# Patient Record
Sex: MALE | Race: WHITE | Employment: PART TIME | URBAN - METROPOLITAN AREA
[De-identification: names, ages, dates, MRNs, and addresses within clinical notes are randomized per-mention and may not be internally consistent; named-entity substitution may affect disease eponyms.]

---

## 2017-01-04 ENCOUNTER — HOSPITAL ENCOUNTER (OUTPATIENT)
Dept: PHYSICAL THERAPY | Age: 67
Discharge: HOME | End: 2017-01-04
Admitting: ORTHOPAEDIC SURGERY

## 2017-01-10 ENCOUNTER — HOSPITAL ENCOUNTER (OUTPATIENT)
Dept: PHYSICAL THERAPY | Age: 67
Discharge: HOME | End: 2017-01-10
Admitting: ORTHOPAEDIC SURGERY

## 2017-01-11 ENCOUNTER — HOSPITAL ENCOUNTER (OUTPATIENT)
Dept: PHYSICAL THERAPY | Age: 67
Discharge: HOME | End: 2017-01-11
Admitting: ORTHOPAEDIC SURGERY

## 2017-01-18 ENCOUNTER — HOSPITAL ENCOUNTER (OUTPATIENT)
Dept: PHYSICAL THERAPY | Age: 67
Discharge: HOME | End: 2017-01-18
Admitting: ORTHOPAEDIC SURGERY

## 2017-01-20 ENCOUNTER — HOSPITAL ENCOUNTER (OUTPATIENT)
Dept: PHYSICAL THERAPY | Age: 67
Discharge: HOME | End: 2017-01-20
Admitting: ORTHOPAEDIC SURGERY

## 2017-01-24 ENCOUNTER — OFFICE VISIT (OUTPATIENT)
Dept: ORTHOPEDIC SURGERY | Age: 67
End: 2017-01-24

## 2017-01-24 VITALS
HEART RATE: 74 BPM | BODY MASS INDEX: 25.33 KG/M2 | WEIGHT: 187 LBS | SYSTOLIC BLOOD PRESSURE: 140 MMHG | HEIGHT: 72 IN | DIASTOLIC BLOOD PRESSURE: 78 MMHG

## 2017-01-24 DIAGNOSIS — M25.572 LEFT ANKLE PAIN, UNSPECIFIED CHRONICITY: Primary | ICD-10-CM

## 2017-01-24 DIAGNOSIS — M19.072 PRIMARY OSTEOARTHRITIS OF LEFT ANKLE: ICD-10-CM

## 2017-01-24 PROCEDURE — 99212 OFFICE O/P EST SF 10 MIN: CPT | Performed by: ORTHOPAEDIC SURGERY

## 2017-01-24 PROCEDURE — 73610 X-RAY EXAM OF ANKLE: CPT | Performed by: ORTHOPAEDIC SURGERY

## 2017-01-26 ENCOUNTER — OFFICE VISIT (OUTPATIENT)
Dept: ORTHOPEDIC SURGERY | Age: 67
End: 2017-01-26

## 2017-01-26 DIAGNOSIS — G57.02 SCIATIC MONONEUROPATHY, LEFT: Primary | ICD-10-CM

## 2017-01-26 PROCEDURE — 95886 MUSC TEST DONE W/N TEST COMP: CPT | Performed by: PHYSICAL MEDICINE & REHABILITATION

## 2017-01-26 PROCEDURE — 95909 NRV CNDJ TST 5-6 STUDIES: CPT | Performed by: PHYSICAL MEDICINE & REHABILITATION

## 2017-01-27 ENCOUNTER — TELEPHONE (OUTPATIENT)
Dept: ORTHOPEDIC SURGERY | Age: 67
End: 2017-01-27

## 2017-01-27 ENCOUNTER — HOSPITAL ENCOUNTER (OUTPATIENT)
Dept: PHYSICAL THERAPY | Age: 67
Discharge: HOME | End: 2017-01-27
Admitting: ORTHOPAEDIC SURGERY

## 2017-02-15 ENCOUNTER — HOSPITAL ENCOUNTER (OUTPATIENT)
Dept: PHYSICAL THERAPY | Age: 67
Discharge: HOME | End: 2017-02-15
Admitting: ORTHOPAEDIC SURGERY

## 2017-03-07 ENCOUNTER — HOSPITAL ENCOUNTER (OUTPATIENT)
Dept: PHYSICAL THERAPY | Age: 67
Discharge: HOME | End: 2017-03-07
Admitting: ORTHOPAEDIC SURGERY

## 2017-03-07 ENCOUNTER — OFFICE VISIT (OUTPATIENT)
Dept: ORTHOPEDIC SURGERY | Age: 67
End: 2017-03-07

## 2017-03-07 VITALS
BODY MASS INDEX: 25.32 KG/M2 | HEART RATE: 72 BPM | WEIGHT: 186.95 LBS | SYSTOLIC BLOOD PRESSURE: 131 MMHG | HEIGHT: 72 IN | DIASTOLIC BLOOD PRESSURE: 81 MMHG

## 2017-03-07 DIAGNOSIS — Z96.662 HISTORY OF TOTAL REPLACEMENT OF LEFT ANKLE: Primary | ICD-10-CM

## 2017-03-07 PROCEDURE — 99212 OFFICE O/P EST SF 10 MIN: CPT | Performed by: ORTHOPAEDIC SURGERY

## 2017-03-07 PROCEDURE — L1902 AFO ANKLE GAUNTLET PRE OTS: HCPCS | Performed by: ORTHOPAEDIC SURGERY

## 2017-05-05 ENCOUNTER — TELEPHONE (OUTPATIENT)
Dept: ORTHOPEDIC SURGERY | Age: 67
End: 2017-05-05

## 2017-05-31 ENCOUNTER — HOSPITAL ENCOUNTER (OUTPATIENT)
Dept: PHYSICAL THERAPY | Age: 67
Discharge: HOME | End: 2017-06-30
Attending: ORTHOPAEDIC SURGERY | Admitting: ORTHOPAEDIC SURGERY

## 2017-06-09 ENCOUNTER — OFFICE VISIT (OUTPATIENT)
Dept: ORTHOPEDIC SURGERY | Age: 67
End: 2017-06-09

## 2017-06-09 VITALS — HEIGHT: 72 IN | WEIGHT: 186.95 LBS | BODY MASS INDEX: 25.32 KG/M2

## 2017-06-09 DIAGNOSIS — Z96.662 HISTORY OF TOTAL REPLACEMENT OF LEFT ANKLE: Primary | ICD-10-CM

## 2017-06-09 PROCEDURE — 73610 X-RAY EXAM OF ANKLE: CPT | Performed by: ORTHOPAEDIC SURGERY

## 2017-06-09 PROCEDURE — 99212 OFFICE O/P EST SF 10 MIN: CPT | Performed by: ORTHOPAEDIC SURGERY

## 2017-06-27 ENCOUNTER — OFFICE VISIT (OUTPATIENT)
Dept: ORTHOPEDIC SURGERY | Age: 67
End: 2017-06-27

## 2017-06-27 DIAGNOSIS — M54.32 SCIATICA OF LEFT SIDE: Primary | ICD-10-CM

## 2017-06-27 PROCEDURE — 95907 NVR CNDJ TST 1-2 STUDIES: CPT | Performed by: PHYSICAL MEDICINE & REHABILITATION

## 2017-06-27 PROCEDURE — 95886 MUSC TEST DONE W/N TEST COMP: CPT | Performed by: PHYSICAL MEDICINE & REHABILITATION

## 2017-06-28 ENCOUNTER — TELEPHONE (OUTPATIENT)
Dept: ORTHOPEDIC SURGERY | Age: 67
End: 2017-06-28

## 2017-06-28 DIAGNOSIS — Z96.662 HISTORY OF TOTAL ANKLE REPLACEMENT, LEFT: Primary | ICD-10-CM

## 2017-10-06 ENCOUNTER — OFFICE VISIT (OUTPATIENT)
Dept: ORTHOPEDIC SURGERY | Age: 67
End: 2017-10-06

## 2017-10-06 VITALS
SYSTOLIC BLOOD PRESSURE: 146 MMHG | HEIGHT: 72 IN | WEIGHT: 186.07 LBS | HEART RATE: 69 BPM | DIASTOLIC BLOOD PRESSURE: 91 MMHG | BODY MASS INDEX: 25.2 KG/M2

## 2017-10-06 DIAGNOSIS — Z96.662 HISTORY OF TOTAL REPLACEMENT OF LEFT ANKLE: ICD-10-CM

## 2017-10-06 DIAGNOSIS — M25.572 LEFT ANKLE PAIN, UNSPECIFIED CHRONICITY: Primary | ICD-10-CM

## 2017-10-06 PROCEDURE — L4350 ANKLE CONTROL ORTHO PRE OTS: HCPCS | Performed by: ORTHOPAEDIC SURGERY

## 2017-10-06 PROCEDURE — 73610 X-RAY EXAM OF ANKLE: CPT | Performed by: ORTHOPAEDIC SURGERY

## 2017-10-06 PROCEDURE — 99212 OFFICE O/P EST SF 10 MIN: CPT | Performed by: ORTHOPAEDIC SURGERY

## 2017-10-06 NOTE — PROGRESS NOTES
Subjective: Patient states that he is here for follow-up of his 9/26/16 left total ankle replacement with postoperative foot drop. He's had 2 EMGs and the most recent one showed no significant change from previous. It shows sciatic mononeuropathy. He states that he's noticed some improvement in his motion but his main complaint is numbness. He does think that is improving. He has no pain whatsoever  Objective: Physical exam shows left anterior ankle incision is well-healed he has 5° of dorsiflexion 30-40° of plantarflexion no instability strength is 3/5 into dorsiflexion eversion 3+4 minus over 5 in a plantarflexion inversion he has some numbness in the 1st webspace and has a circumferential feeling of tightness around the distal midfoot proximal forefoot. Atrophy has decreased he ambulates without assistive device and uses an air sport brace  Imaging: 3 views of his left ankle show that his prosthesis is well aligned no change in position. Assessment and plan: This patient is improving he is using muscle stem at home. I gave him a new air sport brace that he continues which helped some Abbey foot. He did not want a AFO. He states he walks 3 miles a day at [address] and would like to get to the point where he could walk 5 miles a day's back playing golf but it's limited. He has some balance issues.   Follow-up with me in 6 months and then on his 2 year anniversary repeat x-rays on his next visit

## 2018-03-08 ENCOUNTER — OFFICE VISIT (OUTPATIENT)
Dept: ORTHOPEDIC SURGERY | Age: 68
End: 2018-03-08

## 2018-03-08 ENCOUNTER — TELEPHONE (OUTPATIENT)
Dept: ORTHOPEDIC SURGERY | Age: 68
End: 2018-03-08

## 2018-03-08 VITALS
DIASTOLIC BLOOD PRESSURE: 96 MMHG | HEART RATE: 90 BPM | HEIGHT: 72 IN | WEIGHT: 186.07 LBS | BODY MASS INDEX: 25.2 KG/M2 | SYSTOLIC BLOOD PRESSURE: 139 MMHG

## 2018-03-08 DIAGNOSIS — M25.572 LEFT ANKLE PAIN, UNSPECIFIED CHRONICITY: Primary | ICD-10-CM

## 2018-03-08 PROCEDURE — 99212 OFFICE O/P EST SF 10 MIN: CPT | Performed by: ORTHOPAEDIC SURGERY

## 2018-03-09 ENCOUNTER — ORTHOTIC/BRACE ENCOUNTER (OUTPATIENT)
Dept: ORTHOPEDIC SURGERY | Age: 68
End: 2018-03-09

## 2018-03-23 ENCOUNTER — ORTHOTIC/BRACE ENCOUNTER (OUTPATIENT)
Dept: ORTHOPEDIC SURGERY | Age: 68
End: 2018-03-23

## 2018-03-23 DIAGNOSIS — M65.9 SYNOVITIS OF LEFT ANKLE: Primary | ICD-10-CM

## 2018-03-23 DIAGNOSIS — M19.072 PRIMARY OSTEOARTHRITIS OF LEFT ANKLE: ICD-10-CM

## 2018-03-23 DIAGNOSIS — Z96.662 HISTORY OF TOTAL REPLACEMENT OF LEFT ANKLE: ICD-10-CM

## 2018-03-23 PROCEDURE — L1960 AFO POS SOLID ANK PLASTIC MO: HCPCS | Performed by: PEDORTHIST

## 2018-03-23 PROCEDURE — L2275 PLASTIC MOD LOW EXT PAD/LINE: HCPCS | Performed by: PEDORTHIST

## 2018-03-23 PROCEDURE — L2820 SOFT INTERFACE BELOW KNEE SE: HCPCS | Performed by: PEDORTHIST

## 2018-10-02 ENCOUNTER — OFFICE VISIT (OUTPATIENT)
Dept: ORTHOPEDIC SURGERY | Age: 68
End: 2018-10-02
Payer: COMMERCIAL

## 2018-10-02 VITALS — BODY MASS INDEX: 25.73 KG/M2 | WEIGHT: 190 LBS | HEIGHT: 72 IN

## 2018-10-02 DIAGNOSIS — M25.561 RIGHT KNEE PAIN, UNSPECIFIED CHRONICITY: ICD-10-CM

## 2018-10-02 DIAGNOSIS — Z96.662 HISTORY OF TOTAL REPLACEMENT OF LEFT ANKLE: Primary | ICD-10-CM

## 2018-10-02 PROCEDURE — L1902 AFO ANKLE GAUNTLET PRE OTS: HCPCS | Performed by: ORTHOPAEDIC SURGERY

## 2018-10-02 PROCEDURE — 99212 OFFICE O/P EST SF 10 MIN: CPT | Performed by: ORTHOPAEDIC SURGERY

## 2018-10-02 PROCEDURE — 3017F COLORECTAL CA SCREEN DOC REV: CPT | Performed by: ORTHOPAEDIC SURGERY

## 2018-10-02 PROCEDURE — G8484 FLU IMMUNIZE NO ADMIN: HCPCS | Performed by: ORTHOPAEDIC SURGERY

## 2018-10-02 PROCEDURE — 4004F PT TOBACCO SCREEN RCVD TLK: CPT | Performed by: ORTHOPAEDIC SURGERY

## 2018-10-02 PROCEDURE — 1123F ACP DISCUSS/DSCN MKR DOCD: CPT | Performed by: ORTHOPAEDIC SURGERY

## 2018-10-02 PROCEDURE — 1101F PT FALLS ASSESS-DOCD LE1/YR: CPT | Performed by: ORTHOPAEDIC SURGERY

## 2018-10-02 PROCEDURE — G8427 DOCREV CUR MEDS BY ELIG CLIN: HCPCS | Performed by: ORTHOPAEDIC SURGERY

## 2018-10-02 PROCEDURE — G8419 CALC BMI OUT NRM PARAM NOF/U: HCPCS | Performed by: ORTHOPAEDIC SURGERY

## 2018-10-02 PROCEDURE — 4040F PNEUMOC VAC/ADMIN/RCVD: CPT | Performed by: ORTHOPAEDIC SURGERY

## 2018-12-05 ENCOUNTER — OFFICE VISIT (OUTPATIENT)
Dept: ORTHOPEDIC SURGERY | Age: 68
End: 2018-12-05
Payer: COMMERCIAL

## 2018-12-05 VITALS
HEART RATE: 71 BPM | SYSTOLIC BLOOD PRESSURE: 151 MMHG | HEIGHT: 72 IN | WEIGHT: 190.04 LBS | BODY MASS INDEX: 25.74 KG/M2 | DIASTOLIC BLOOD PRESSURE: 96 MMHG

## 2018-12-05 DIAGNOSIS — M17.11 PRIMARY OSTEOARTHRITIS OF RIGHT KNEE: ICD-10-CM

## 2018-12-05 DIAGNOSIS — M25.561 RIGHT KNEE PAIN, UNSPECIFIED CHRONICITY: Primary | ICD-10-CM

## 2018-12-05 DIAGNOSIS — M65.9 SYNOVITIS OF RIGHT KNEE: ICD-10-CM

## 2018-12-05 DIAGNOSIS — M22.41 CHONDROMALACIA OF PATELLA, RIGHT: ICD-10-CM

## 2018-12-05 PROCEDURE — 99214 OFFICE O/P EST MOD 30 MIN: CPT | Performed by: FAMILY MEDICINE

## 2018-12-05 PROCEDURE — 1123F ACP DISCUSS/DSCN MKR DOCD: CPT | Performed by: FAMILY MEDICINE

## 2018-12-05 PROCEDURE — 3017F COLORECTAL CA SCREEN DOC REV: CPT | Performed by: FAMILY MEDICINE

## 2018-12-05 PROCEDURE — G8484 FLU IMMUNIZE NO ADMIN: HCPCS | Performed by: FAMILY MEDICINE

## 2018-12-05 PROCEDURE — 1101F PT FALLS ASSESS-DOCD LE1/YR: CPT | Performed by: FAMILY MEDICINE

## 2018-12-05 PROCEDURE — G8419 CALC BMI OUT NRM PARAM NOF/U: HCPCS | Performed by: FAMILY MEDICINE

## 2018-12-05 PROCEDURE — 4040F PNEUMOC VAC/ADMIN/RCVD: CPT | Performed by: FAMILY MEDICINE

## 2018-12-05 PROCEDURE — L1812 KO ELASTIC W/JOINTS PRE OTS: HCPCS | Performed by: FAMILY MEDICINE

## 2018-12-05 PROCEDURE — 4004F PT TOBACCO SCREEN RCVD TLK: CPT | Performed by: FAMILY MEDICINE

## 2018-12-05 PROCEDURE — G8427 DOCREV CUR MEDS BY ELIG CLIN: HCPCS | Performed by: FAMILY MEDICINE

## 2018-12-05 PROCEDURE — 20610 DRAIN/INJ JOINT/BURSA W/O US: CPT | Performed by: FAMILY MEDICINE

## 2018-12-05 RX ORDER — MELOXICAM 15 MG/1
15 TABLET ORAL DAILY
Qty: 30 TABLET | Refills: 3 | Status: SHIPPED | OUTPATIENT
Start: 2018-12-05 | End: 2019-04-08

## 2018-12-05 NOTE — PATIENT INSTRUCTIONS
Patient Education        Patellofemoral Pain Syndrome (Runner's Knee): Exercises  Your Care Instructions  Here are some examples of typical rehabilitation exercises for your condition. Start each exercise slowly. Ease off the exercise if you start to have pain. Your doctor or physical therapist will tell you when you can start these exercises and which ones will work best for you. How to do the exercises  Calf wall stretch    1. Stand facing a wall with your hands on the wall at about eye level. Put your affected leg about a step behind your other leg. 2. Keeping your back leg straight and your back heel on the floor, bend your front knee and gently bring your hip and chest toward the wall until you feel a stretch in the calf of your back leg. 3. Hold the stretch for at least 15 to 30 seconds. 4. Repeat 2 to 4 times. 5. Repeat steps 1 through 4, but this time keep your back knee bent. Quadriceps stretch    1. If you are not steady on your feet, hold on to a chair, counter, or wall. 2. Bend your affected leg, and reach behind you to grab the front of your foot or ankle with the hand on the same side. For example, if you are stretching your right leg, use your right hand. 3. Keeping your knees next to each other, pull your foot toward your buttock until you feel a gentle stretch across the front of your hip and down the front of your thigh. Your knee should be pointed directly to the ground, and not out to the side. 4. Hold the stretch for at least 15 to 30 seconds. 5. Repeat 2 to 4 times. Hamstring wall stretch    1. Lie on your back in a doorway, with your good leg through the open door. 2. Slide your affected leg up the wall to straighten your knee. You should feel a gentle stretch down the back of your leg. 1. Do not arch your back. 2. Do not bend either knee. 3. Keep one heel touching the floor and the other heel touching the wall. Do not point your toes.   3. Hold the stretch for at least 1

## 2018-12-06 NOTE — PROGRESS NOTES
reproduced with patellar grind testing. There does not appear to be high-grade instability. His hamstrings are somewhat tight. He is able to completely extend with flexion 140. Negative Emily's testing. Negative stability testing. Additional Examinations:       Contralateral Exam: Examination of the right ankle reveals intact skin. There is no focal tenderness or swelling. The patient demonstrates full painless range of motion with regards to plantarflexion, dorsiflexion, inversion, eversion. Strength is 5/5 thorough out all planes. Ligamentous stability is grossly intact. Examination of the right knee reveals intact skin. There is no focal tenderness. The patient demonstrates full painless range of motion with regards to flexion and extension. Strength is 5/5 thorough out all planes. Ligamentous stability is grossly intact. Right Lower Extremity: Examination of the right lower extremity does not show any tenderness, deformity or injury. Range of motion is unremarkable. There is no gross instability. There are no rashes, ulcerations or lesions. Strength and tone are normal.  Left Lower Extremity: Examination of the left lower extremity does not show any tenderness, deformity or injury. Range of motion is unremarkable. There is no gross instability. There are no rashes, ulcerations or lesions. Strength and tone are normal.  Lower Back: Examination of the lower back does not show any tenderness, deformity or injury. Range of motion is unremarkable. There is no gross instability. There are no rashes, ulcerations or lesions. Strength and tone are normal.        Diagnostic Test Findings:     right knee AP and PA weight-bearing sunrise and lateral films were obtained today which showed evidence of medial compartment narrowing with early patellofemoral arthropathy.         Assessment :  #1.  3-6 months status post recurrent aggravation right knee medial anterior compartment osteoarthritis

## 2019-04-08 ENCOUNTER — OFFICE VISIT (OUTPATIENT)
Dept: ORTHOPEDIC SURGERY | Age: 69
End: 2019-04-08
Payer: COMMERCIAL

## 2019-04-08 VITALS
SYSTOLIC BLOOD PRESSURE: 133 MMHG | BODY MASS INDEX: 25.74 KG/M2 | HEART RATE: 80 BPM | HEIGHT: 72 IN | DIASTOLIC BLOOD PRESSURE: 79 MMHG | WEIGHT: 190.04 LBS

## 2019-04-08 DIAGNOSIS — G89.29 CHRONIC PAIN OF RIGHT KNEE: ICD-10-CM

## 2019-04-08 DIAGNOSIS — M25.561 CHRONIC PAIN OF RIGHT KNEE: ICD-10-CM

## 2019-04-08 DIAGNOSIS — M17.11 PRIMARY OSTEOARTHRITIS OF RIGHT KNEE: ICD-10-CM

## 2019-04-08 DIAGNOSIS — M22.41 CHONDROMALACIA OF PATELLA, RIGHT: Primary | ICD-10-CM

## 2019-04-08 PROCEDURE — 99214 OFFICE O/P EST MOD 30 MIN: CPT | Performed by: FAMILY MEDICINE

## 2019-04-08 RX ORDER — METHYLPREDNISOLONE 4 MG/1
TABLET ORAL
Qty: 21 KIT | Refills: 0 | Status: SHIPPED | OUTPATIENT
Start: 2019-04-08 | End: 2019-04-15 | Stop reason: ALTCHOICE

## 2019-04-08 NOTE — PROGRESS NOTES
contralateral left ankle. He did get some temporary improvement with his steroid injection several months ago but this is worn out and with the onset of golf season is now having worsening anterior medial knee pain. He tries to perform his home-based exercises and does take Aleve. He may have recently been noticing some catching with ptosis pain being medial and anterior in nature. He tries to perform his home-based exercise program and does utilize a knee brace periodically. He has not had tien locking. He does have a baseline dull achy pain at about a 3-4 out of 10 with rest and standing and walking but if he pivots and walks for long distances and goes up and down stairs and can still be in the range of 5-7 out of 10. He is not really experiencing high-grade night pain. Medical History    Patient's medications, allergies, past medical, surgical, social and family histories were reviewed 12/5/2018 and updated as appropriate. Review of Systems    Relevant review of systems reviewed 12/5/2018 and available in the patient's chart     Vital Signs  Vitals:    04/08/19 1400   BP: 133/79   Pulse: 80       General Exam:     Constitutional: Patient is adequately groomed with no evidence of malnutrition  DTRs: Deep tendon reflexes are intact  Mental Status: The patient is oriented to time, place and person. The patient's mood and affect are appropriate. Lymphatic: The lymphatic examination bilaterally reveals all areas to be without enlargement or induration. Vascular: Examination reveals no swelling or calf tenderness. Peripheral pulses are palpable and 2+. Neurological: The patient has good coordination. There is no weakness or sensory deficit. Right knee Examination  Inspection:  He does have mild swelling to his knee with some patellofemoral crepitation but no high-grade deformities.     Palpation:  He does have persistent tenderness medial and lateral patellofemoral facet and mild pain with patellar grind testing was noted. He does have diffuse anterior posterior 3rd medial joint line tenderness with less pain with lateral joint line palpation. Rang of Motion:  He is stiff at terminal 5° of extension with flexion to about 120 today. Hamstrings are tight. Strength:  He does have some weakness at 4-4+ out of 5 with the flexion and extension. Special Tests:  He does have a mildly positive patellar grind test.  Some mild pain with medial Emily's but no high-grade click. I do not sense high-grade instability and a screening hip testing is benign. Skin: There are no rashes, ulcerations or lesions. Distal motor sensory and vascular exam is intact. Gait:Minimal altalgia. He is status post total ankle arthroplasty in the past by Dr. Valerie Meyers. Reflex symmetrically preserved. Additional Comments:   Left knee examination does reveal a trace knee joint effusion with patellofemoral crepitation. He does have a mildly positive grind test.  He does have some mild anterior 3rd medial joint line tenderness. Most of his knee pain seems to be reproduced with patellar grind testing. There does not appear to be high-grade instability. His hamstrings are somewhat tight. He is able to completely extend with flexion 140. Negative Emily's testing. Negative stability testing. Additional Examinations:       Contralateral Exam: Examination of the right ankle reveals intact skin. There is no focal tenderness or swelling. The patient demonstrates full painless range of motion with regards to plantarflexion, dorsiflexion, inversion, eversion. Strength is 5/5 thorough out all planes. Ligamentous stability is grossly intact. Examination of the right knee reveals intact skin. There is no focal tenderness. The patient demonstrates full painless range of motion with regards to flexion and extension. Strength is 5/5 thorough out all planes.   Ligamentous stability is grossly intact. Right Lower Extremity: Examination of the right lower extremity does not show any tenderness, deformity or injury. Range of motion is unremarkable. There is no gross instability. There are no rashes, ulcerations or lesions. Strength and tone are normal.  Left Lower Extremity: Examination of the left lower extremity does not show any tenderness, deformity or injury. Range of motion is unremarkable. There is no gross instability. There are no rashes, ulcerations or lesions. Strength and tone are normal.  Lower Back: Examination of the lower back does not show any tenderness, deformity or injury. Range of motion is unremarkable. There is no gross instability. There are no rashes, ulcerations or lesions. Strength and tone are normal.        Diagnostic Test Findings:             Assessment :  #1.  6-9 months status post persistent recurrent aggravation right knee medial anterior compartment osteoarthritis with chondromalacia patella and synovitis 5+ year status post medial and lateral meniscectomies. #2 status post left total ankle arthroplasty per Dr. Gerard Espinosa. #3.. Reasonably stable left knee medial anterior compartment osteoarthritis. #4. Currently seeing rheumatology with history of PMR and rheumatoid arthritis (patient is stable on Embrel)  Impression:    Encounter Diagnoses   Name Primary?  Chondromalacia of patella, right Yes    Primary osteoarthritis of right knee     Chronic pain of right knee        Office Procedures:    Orders Placed This Encounter   Procedures    MRI Knee Right WO Contrast     Scheduling Instructions:      [facility] [place] [address] [address] [facility] #:      TIME AND DATE TBD      PLEASE CALL PATIENT ONCE APPROVED TO SCHEDULE             Remember that it may take several business days to pre-cert your MRI through your insurance.  Our office will contact you as soon as we have the approval. We will not give any test results over the phone. Please call [phone] once you have your test day and time to schedule a follow up with Dr. Crystal Smalls. Order Specific Question:   Reason for exam:     Answer:   EVAL OA/CMP. R/O REMNANT MMT            Treatment Plan:  Treatment options were discussed with Rnean Ramires. We did once again review his exam and plain film findings. .  Does have medial and anterior compartment osteoarthritis to his right knee and is about 5+ years out from his left knee arthroscopy with medial and lateral meniscectomy. With his lack of improvement over the last 5 months, but from an MRI to evaluate the degree of his underlying arthritic changes and also to rule out a remnant medial meniscus tear. He may continue with his home brace although he may wish to consider a new J brace as his one from years ago is worn out. We did place him on a Medrol Dosepak and he will then continue with his Aleve 2 pills twice daily will continue with his icing and home-based exercises. He is going out of town at the end of next week and hopefully we can see him back post-MRI prior to this. He will continue with his Enbrel per rheumatology. Icing and activity modification was discussed. I will see him back post imaging but he was encouraged to contact us to interim with questions or concerns.

## 2019-04-08 NOTE — PATIENT INSTRUCTIONS
*If you're currently taking an anti-inflammatory such as Advil, Aleve, Ibuprofen, Diclofenac, Naproxen, Meloxicam, Celebrex, or Relafen, please stop. *Take Medrol as directed on foil side of packaging. *Once you are finished with the medrol, then you may re-start or start your anti-inflammatory (ALEVE 2 TABLETS 2 TIMES DAILY) the following day.

## 2019-04-09 ENCOUNTER — TELEPHONE (OUTPATIENT)
Dept: ORTHOPEDIC SURGERY | Age: 69
End: 2019-04-09

## 2019-04-15 ENCOUNTER — OFFICE VISIT (OUTPATIENT)
Dept: ORTHOPEDIC SURGERY | Age: 69
End: 2019-04-15
Payer: COMMERCIAL

## 2019-04-15 VITALS
SYSTOLIC BLOOD PRESSURE: 133 MMHG | BODY MASS INDEX: 25.74 KG/M2 | WEIGHT: 190.04 LBS | DIASTOLIC BLOOD PRESSURE: 85 MMHG | HEIGHT: 72 IN | HEART RATE: 74 BPM

## 2019-04-15 DIAGNOSIS — M25.561 RIGHT KNEE PAIN, UNSPECIFIED CHRONICITY: ICD-10-CM

## 2019-04-15 DIAGNOSIS — M17.11 PRIMARY OSTEOARTHRITIS OF RIGHT KNEE: Primary | ICD-10-CM

## 2019-04-15 DIAGNOSIS — S83.241A OTHER TEAR OF MEDIAL MENISCUS OF RIGHT KNEE AS CURRENT INJURY, INITIAL ENCOUNTER: ICD-10-CM

## 2019-04-15 DIAGNOSIS — M22.41 CHONDROMALACIA PATELLAE OF RIGHT KNEE: ICD-10-CM

## 2019-04-15 PROCEDURE — 99214 OFFICE O/P EST MOD 30 MIN: CPT | Performed by: FAMILY MEDICINE

## 2019-04-30 ENCOUNTER — APPOINTMENT (OUTPATIENT)
Dept: GENERAL RADIOLOGY | Age: 69
End: 2019-04-30
Payer: OTHER MISCELLANEOUS

## 2019-04-30 ENCOUNTER — HOSPITAL ENCOUNTER (EMERGENCY)
Age: 69
Discharge: HOME | End: 2019-04-30
Attending: EMERGENCY MEDICINE
Payer: OTHER MISCELLANEOUS

## 2019-04-30 VITALS
BODY MASS INDEX: 26.6 KG/M2 | RESPIRATION RATE: 12 BRPM | HEART RATE: 78 BPM | SYSTOLIC BLOOD PRESSURE: 135 MMHG | TEMPERATURE: 98.7 F | HEIGHT: 71 IN | DIASTOLIC BLOOD PRESSURE: 91 MMHG | OXYGEN SATURATION: 98 % | WEIGHT: 190 LBS

## 2019-04-30 DIAGNOSIS — S80.212A ABRASION OF LEFT KNEE, INITIAL ENCOUNTER: ICD-10-CM
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
## 2019-04-30 PROCEDURE — 90471 IMMUNIZATION ADMIN: CPT | Performed by: EMERGENCY MEDICINE

## 2019-04-30 PROCEDURE — 73562 X-RAY EXAM OF KNEE 3: CPT

## 2019-04-30 PROCEDURE — 99283 EMERGENCY DEPT VISIT LOW MDM: CPT

## 2019-04-30 PROCEDURE — 6360000002 HC RX W HCPCS: Performed by: EMERGENCY MEDICINE

## 2019-04-30 PROCEDURE — 90715 TDAP VACCINE 7 YRS/> IM: CPT | Performed by: EMERGENCY MEDICINE

## 2019-04-30 RX ADMIN — TETANUS TOXOID, REDUCED DIPHTHERIA TOXOID AND ACELLULAR PERTUSSIS VACCINE, ADSORBED 0.5 ML: 5; 2.5; 8; 8; 2.5 SUSPENSION INTRAMUSCULAR at 15:38
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
## 2019-05-03 ENCOUNTER — OFFICE VISIT (OUTPATIENT)
Dept: ORTHOPEDIC SURGERY | Age: 69
End: 2019-05-03
Payer: COMMERCIAL

## 2019-05-03 VITALS
DIASTOLIC BLOOD PRESSURE: 92 MMHG | HEIGHT: 71 IN | HEART RATE: 66 BPM | BODY MASS INDEX: 26.6 KG/M2 | WEIGHT: 190.04 LBS | SYSTOLIC BLOOD PRESSURE: 159 MMHG

## 2019-05-03 DIAGNOSIS — M25.561 ACUTE PAIN OF RIGHT KNEE: Primary | ICD-10-CM

## 2019-05-03 PROCEDURE — 99212 OFFICE O/P EST SF 10 MIN: CPT | Performed by: ORTHOPAEDIC SURGERY
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
## 2019-10-02 ENCOUNTER — OFFICE VISIT (OUTPATIENT)
Dept: ORTHOPEDIC SURGERY | Age: 69
End: 2019-10-02
Payer: OTHER MISCELLANEOUS

## 2019-10-02 VITALS
BODY MASS INDEX: 26.6 KG/M2 | HEART RATE: 72 BPM | HEIGHT: 71 IN | WEIGHT: 190.04 LBS | SYSTOLIC BLOOD PRESSURE: 139 MMHG | DIASTOLIC BLOOD PRESSURE: 84 MMHG

## 2019-10-02 DIAGNOSIS — S83.241A OTHER TEAR OF MEDIAL MENISCUS OF RIGHT KNEE AS CURRENT INJURY, INITIAL ENCOUNTER: Primary | ICD-10-CM

## 2019-10-02 DIAGNOSIS — M25.561 ACUTE PAIN OF RIGHT KNEE: ICD-10-CM

## 2019-10-02 DIAGNOSIS — M17.11 PRIMARY OSTEOARTHRITIS OF RIGHT KNEE: ICD-10-CM

## 2019-10-02 DIAGNOSIS — M22.41 CHONDROMALACIA PATELLAE OF RIGHT KNEE: ICD-10-CM

## 2019-10-02 PROCEDURE — 99214 OFFICE O/P EST MOD 30 MIN: CPT | Performed by: FAMILY MEDICINE

## 2019-10-02 PROCEDURE — 20610 DRAIN/INJ JOINT/BURSA W/O US: CPT | Performed by: FAMILY MEDICINE

## 2019-10-02 RX ORDER — DICLOFENAC SODIUM 75 MG/1
75 TABLET, DELAYED RELEASE ORAL 2 TIMES DAILY
Qty: 60 TABLET | Refills: 3 | Status: SHIPPED | OUTPATIENT
Start: 2019-10-02 | End: 2021-09-22 | Stop reason: ALTCHOICE

## 2019-10-02 RX ORDER — BETAMETHASONE SODIUM PHOSPHATE AND BETAMETHASONE ACETATE 3; 3 MG/ML; MG/ML
12 INJECTION, SUSPENSION INTRA-ARTICULAR; INTRALESIONAL; INTRAMUSCULAR; SOFT TISSUE ONCE
Status: COMPLETED | OUTPATIENT
Start: 2019-10-02 | End: 2019-10-02

## 2019-10-02 RX ADMIN — BETAMETHASONE SODIUM PHOSPHATE AND BETAMETHASONE ACETATE 12 MG: 3; 3 INJECTION, SUSPENSION INTRA-ARTICULAR; INTRALESIONAL; INTRAMUSCULAR; SOFT TISSUE at 13:23

## 2019-10-16 ENCOUNTER — TELEPHONE (OUTPATIENT)
Dept: ORTHOPEDIC SURGERY | Age: 69
End: 2019-10-16

## 2019-10-21 ENCOUNTER — OFFICE VISIT (OUTPATIENT)
Dept: ORTHOPEDIC SURGERY | Age: 69
End: 2019-10-21
Payer: COMMERCIAL

## 2019-10-21 VITALS
WEIGHT: 190.04 LBS | BODY MASS INDEX: 26.6 KG/M2 | HEART RATE: 83 BPM | SYSTOLIC BLOOD PRESSURE: 135 MMHG | DIASTOLIC BLOOD PRESSURE: 82 MMHG | HEIGHT: 71 IN

## 2019-10-21 DIAGNOSIS — M25.561 ACUTE PAIN OF RIGHT KNEE: ICD-10-CM

## 2019-10-21 DIAGNOSIS — S83.241A OTHER TEAR OF MEDIAL MENISCUS OF RIGHT KNEE AS CURRENT INJURY, INITIAL ENCOUNTER: ICD-10-CM

## 2019-10-21 DIAGNOSIS — M17.11 PRIMARY OSTEOARTHRITIS OF RIGHT KNEE: Primary | ICD-10-CM

## 2019-10-21 DIAGNOSIS — M22.41 CHONDROMALACIA PATELLAE OF RIGHT KNEE: ICD-10-CM

## 2019-10-21 PROCEDURE — 20610 DRAIN/INJ JOINT/BURSA W/O US: CPT | Performed by: FAMILY MEDICINE

## 2019-10-21 PROCEDURE — 99213 OFFICE O/P EST LOW 20 MIN: CPT | Performed by: FAMILY MEDICINE

## 2019-10-28 ENCOUNTER — OFFICE VISIT (OUTPATIENT)
Dept: ORTHOPEDIC SURGERY | Age: 69
End: 2019-10-28
Payer: COMMERCIAL

## 2019-10-28 VITALS — BODY MASS INDEX: 26.6 KG/M2 | WEIGHT: 190.04 LBS | HEIGHT: 71 IN

## 2019-10-28 DIAGNOSIS — M17.11 PRIMARY OSTEOARTHRITIS OF RIGHT KNEE: Primary | ICD-10-CM

## 2019-10-28 DIAGNOSIS — M25.561 ACUTE PAIN OF RIGHT KNEE: ICD-10-CM

## 2019-10-28 DIAGNOSIS — M22.41 CHONDROMALACIA PATELLAE OF RIGHT KNEE: ICD-10-CM

## 2019-10-28 PROCEDURE — 20610 DRAIN/INJ JOINT/BURSA W/O US: CPT | Performed by: FAMILY MEDICINE

## 2019-11-04 ENCOUNTER — OFFICE VISIT (OUTPATIENT)
Dept: ORTHOPEDIC SURGERY | Age: 69
End: 2019-11-04
Payer: COMMERCIAL

## 2019-11-04 VITALS — HEIGHT: 71 IN | WEIGHT: 190.04 LBS | BODY MASS INDEX: 26.6 KG/M2

## 2019-11-04 DIAGNOSIS — M22.41 CHONDROMALACIA PATELLAE OF RIGHT KNEE: ICD-10-CM

## 2019-11-04 DIAGNOSIS — M17.11 PRIMARY OSTEOARTHRITIS OF RIGHT KNEE: Primary | ICD-10-CM

## 2019-11-04 DIAGNOSIS — M25.561 ACUTE PAIN OF RIGHT KNEE: ICD-10-CM

## 2019-11-04 PROCEDURE — 20610 DRAIN/INJ JOINT/BURSA W/O US: CPT | Performed by: FAMILY MEDICINE

## 2020-01-15 ENCOUNTER — OFFICE VISIT (OUTPATIENT)
Dept: ORTHOPEDIC SURGERY | Age: 70
End: 2020-01-15
Payer: COMMERCIAL

## 2020-01-15 VITALS — BODY MASS INDEX: 26.6 KG/M2 | HEIGHT: 71 IN | WEIGHT: 190.04 LBS

## 2020-01-15 PROCEDURE — 99213 OFFICE O/P EST LOW 20 MIN: CPT | Performed by: FAMILY MEDICINE

## 2020-01-15 NOTE — PROGRESS NOTES
reproduced with patellar grind testing.  There does not appear to be high-grade instability.  His hamstrings are somewhat tight.  He is able to completely extend with flexion 140.  Negative Emily's testing.  Negative stability testing.     Additional Examinations:       Contralateral Exam: Examination of the right ankle reveals intact skin.  There is no focal tenderness or swelling.  The patient demonstrates full painless range of motion with regards to plantarflexion, dorsiflexion, inversion, eversion.  Strength is 5/5 thorough out all planes.  Ligamentous stability is grossly intact. Examination of the right knee reveals intact skin.  There is no focal tenderness.  The patient demonstrates full painless range of motion with regards to flexion and extension.  Strength is 5/5 thorough out all planes.  Ligamentous stability is grossly intact.     Right Lower Extremity: Examination of the right lower extremity does not show any tenderness, deformity or injury.  Range of motion is unremarkable. Linda Luis is no gross instability.  There are no rashes, ulcerations or lesions.  Strength and tone are normal.  Left Lower Extremity: Examination of the left lower extremity does not show any tenderness, deformity or injury.  Range of motion is unremarkable. Linda Luis is no gross instability.  There are no rashes, ulcerations or lesions.  Strength and tone are normal.  Lower Back: Examination of the lower back does not show any tenderness, deformity or injury.  Range of motion is unremarkable. Linda Luis is no gross instability.  There are no rashes, ulcerations or lesions.  Strength and tone are normal.        Diagnostic Test Findings:     MRI right knee obtained 4/12/2019 as listed above     Narrative   [facility] [id number] #: [id number] Procedure: MR Left Knee w/o Contrast ; Reason for Exam: eval oa/cmp. r/o remnant mmt. primary osteoarthritis of right knee. chronic pain of right knee.    This document is confidential medical information.  Unauthorized disclosure or use of this information is prohibited by law. If you are not the intended recipient of this document, please advise us by calling immediately [phone].       [facility] [address]           Patient Name: Suad Weaver   Case ID: [id number]   Patient : 1950   Referring Physician: Nathaniel Tillman MD   Exam Date: 2019   Exam Description: MR Left Knee w/o Contrast            HISTORY:  Evaluate OA/CMP.  Evaluate for remnant medial meniscus tear. Primary osteoarthritis    of right knee. Chronic pain of right knee.       TECHNICAL FACTORS:  Long- and short-axis fat- and water-weighted images were performed. 1.5    High Field Oval.        COMPARISON:  Prior right knee MRI May 15, 2012.       FINDINGS:  Fissuring and fraying involves the superficial patellar cartilage.       ACL, PCL, MCL, flexor mechanism, and extensor mechanism are intact.       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  Prior study demonstrated interstitial tear of the MCL and    mineralization in this region as well.       Medial meniscus is small and has been partially resected.  Remnant tearing is present.  Lateral    meniscus body and free edge fraying.  Meniscectomy change is suspected.       Dissecting Baker's cyst is noted posteromedially.  Intermediate-grade-to-high-grade chondral    irregularity involves the weight and nonweightbearing medial femoral condyle and medial tibia.     ACL and  PCL sheath thickening is present.       CONCLUSION:   1. Interval partial medial and lateral meniscectomy.  Remnant tearing involves the posterior    horn-body remnant.  High-grade chondromalacia involves the weight and nonweightbearing medial    femoral condyle and less so medial tibia. 2. Intermediate-grade-to-high-grade patellofemoral chondromalacia.    3. ACL and PCL sheath thickening or sheath synovitis.  No tear is demonstrated. 4. Dissecting septated, leaking or dehisced Baker's cyst posteromedially.  Synovial thickening    is present within the Baker's cyst.  Semimembranosus bursal inflammation is contributory. 5. Chronic MCL sprain and scarring.  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 6. Please see above.                       Thank you for the opportunity to provide your interpretation.       Priyank Wilson MD       A: Ryan 04/12/2019 9:51 AM   T: YAZMIN 04/12/2019 9:01 AM                 Assessment & Plan:    Encounter Diagnoses   Name Primary?  Primary osteoarthritis of right knee Yes    Chondromalacia patellae of right knee     Acute pain of right knee        No orders of the defined types were placed in this encounter. Treatment Plan:  Treatment options were discussed with Carolina Chang. We did once again review his exam and recent right knee MRI film findings. Fernanda Lawrence have medial and anterior compartment osteoarthritis to his right knee and is about 5+ years out from his left knee arthroscopy with medial and lateral meniscectomy.  He does appear to have a small remnant tear to the medial meniscus but I think the majority the pain is likely related to his  high-grade medial anterior compartment osteo-arthritic change.    He did have consultation with Dr. Anamaria Ling who recommended against surgical intervention. Is doing reasonably well with about a 70% improvement following completion of his Euflexxa series on 11/4/2019. He states that his current symptoms are tolerable and would not require steroid injection at this time. He may continue with his wraparound brace and will continue with his home-based exercise program as well as over-the-counter Aleve which he takes only episodically. Try the diclofenac. He is aware that he can have repeat Visco supplementation after 5/4/2020.   He does have a trip to [place] for a couple weeks in April of this year and may call to request a steroid injection if he is a little bit more sore. He will contact us in the interim with questions or concerns.             This dictation was performed with a verbal recognition program (DRAGON) and it was checked for errors. It is possible that there are still dictated errors within this office note. If so, please bring any errors to my attention for an addendum. All efforts were made to ensure that this office note is accurate.

## 2020-04-13 ENCOUNTER — TELEPHONE (OUTPATIENT)
Dept: ORTHOPEDIC SURGERY | Age: 70
End: 2020-04-13

## 2020-05-06 ENCOUNTER — OFFICE VISIT (OUTPATIENT)
Dept: ORTHOPEDIC SURGERY | Age: 70
End: 2020-05-06
Payer: COMMERCIAL

## 2020-05-06 PROCEDURE — 20610 DRAIN/INJ JOINT/BURSA W/O US: CPT | Performed by: FAMILY MEDICINE

## 2020-05-06 PROCEDURE — 99213 OFFICE O/P EST LOW 20 MIN: CPT | Performed by: FAMILY MEDICINE

## 2020-05-06 NOTE — PROGRESS NOTES
Chief Complaint  Knee Pain      Tosin Thrasher is a 79 y.o. 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 Vijay Mallory who is seen today for follow-up on his right knee osteoarthritis and consideration of repeat Visco supplementation    History of Present Illness for Follow Up Patient:      Emy Mathew is being seen in follow up today for chronic right knee pain. The patient rates their current pain as a 2 out of 10 on the pain scale. His issues are mostly general stiffness and has some trouble with getting in and out of a car. Activities relieving current symptoms include rest. Treatment to date includes: rest, ice, NSAID-Aleve periodically, home exercises, cortisone injection and Orthovisc series that he finished on 2019 to treat this problem and symptoms are improving. Tosin Thrasher reports a 70-75 % improvement in symptoms. He states that his current symptoms are very tolerable takes only occasional Aleve. He canceled his vacation trip to [place] last month to the coronavirus epidemic. He denies active locking or catching. Attest: I have reviewed and attest the documentation of the HPI documented by my . I will make any changes if necessary. Enc Date: 2020  Time: 12:56 PM  Provider: Alexandra Kenney MD        Social History     Tobacco Use    Smoking status: Current Some Day Smoker     Types: Cigars     Last attempt to quit: 2012     Years since quittin.2    Smokeless tobacco: Never Used   Substance Use Topics    Alcohol use: No     Alcohol/week: 0.0 standard drinks     Comment: quit drinking in 2015 due to medication    Drug use: No        Review of Systems  Pertinent items are noted in HPI  Review of systems reviewed from Patient History Form dated on 1/15/2020 and available in the patient's chart under the Media tab. standard prefilled 2 cc syringe. He may continue with his wraparound knee brace and his home-based exercise program as well as his anti-inflammatory medications. He will be seen back each of the next 2 weeks to continue with his Visco supplementation series. He will contact us in the interim with questions or concerns.             This dictation was performed with a verbal recognition program (DRAGON) and it was checked for errors. It is possible that there are still dictated errors within this office note. If so, please bring any errors to my attention for an addendum. All efforts were made to ensure that this office note is accurate.

## 2020-05-13 ENCOUNTER — OFFICE VISIT (OUTPATIENT)
Dept: ORTHOPEDIC SURGERY | Age: 70
End: 2020-05-13
Payer: COMMERCIAL

## 2020-05-13 VITALS — HEIGHT: 71 IN | BODY MASS INDEX: 26.6 KG/M2 | WEIGHT: 190.04 LBS

## 2020-05-13 PROCEDURE — 20610 DRAIN/INJ JOINT/BURSA W/O US: CPT | Performed by: FAMILY MEDICINE

## 2020-05-20 ENCOUNTER — OFFICE VISIT (OUTPATIENT)
Dept: ORTHOPEDIC SURGERY | Age: 70
End: 2020-05-20
Payer: COMMERCIAL

## 2020-05-20 VITALS — HEIGHT: 71 IN | BODY MASS INDEX: 26.6 KG/M2 | WEIGHT: 190.04 LBS

## 2020-05-20 PROCEDURE — 20610 DRAIN/INJ JOINT/BURSA W/O US: CPT | Performed by: FAMILY MEDICINE

## 2020-05-20 NOTE — PROGRESS NOTES
does anticipate going to [place] in late September and we can certainly see him in early September for a pre-vacation steroid injection if need be. He will contact us in the interim with questions or concerns.

## 2020-08-19 ENCOUNTER — TELEPHONE (OUTPATIENT)
Dept: ORTHOPEDIC SURGERY | Age: 70
End: 2020-08-19

## 2021-06-02 ENCOUNTER — OFFICE VISIT (OUTPATIENT)
Dept: ORTHOPEDIC SURGERY | Age: 71
End: 2021-06-02
Payer: COMMERCIAL

## 2021-06-02 VITALS — HEIGHT: 72 IN | WEIGHT: 185 LBS | BODY MASS INDEX: 25.06 KG/M2

## 2021-06-02 DIAGNOSIS — M17.11 PRIMARY OSTEOARTHRITIS OF RIGHT KNEE: Primary | ICD-10-CM

## 2021-06-02 DIAGNOSIS — M25.561 ACUTE PAIN OF RIGHT KNEE: ICD-10-CM

## 2021-06-02 DIAGNOSIS — M22.41 CHONDROMALACIA PATELLAE OF RIGHT KNEE: ICD-10-CM

## 2021-06-02 PROCEDURE — 99213 OFFICE O/P EST LOW 20 MIN: CPT | Performed by: FAMILY MEDICINE

## 2021-06-02 RX ORDER — DICLOFENAC SODIUM 75 MG/1
75 TABLET, DELAYED RELEASE ORAL 2 TIMES DAILY
Qty: 60 TABLET | Refills: 3 | Status: SHIPPED | OUTPATIENT
Start: 2021-06-02 | End: 2021-09-22 | Stop reason: ALTCHOICE

## 2021-06-02 RX ORDER — HYALURONATE SODIUM 16.8MG/2ML
168 SYRINGE (ML) INTRAARTICULAR ONCE
Qty: 2 ML | Refills: 0 | Status: SHIPPED
Start: 2021-06-02 | End: 2021-06-02 | Stop reason: CLARIF

## 2021-06-02 NOTE — PROGRESS NOTES
Chief Complaint  Knee Pain (R knee pain)      Quyen Garcia is a 70 y.o. 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 Rolando Gallardo who is seen today for follow-up on his recurrence of right knee pain. History of Present Illness for Follow Up Patient:      Kym Brown is being seen in follow up today for a recent acute on chronic right knee pain. The patient rates their current pain as a 2 out of 10 on the pain scale. His issues are mostly general stiffness and has some trouble with getting in and out of a car. Activities relieving current symptoms include rest. Treatment to date includes: rest, ice, NSAID- Diclofenac, home exercises, cortisone injection and Orthovisc series that he finished on 11/4/2019 to treat this problem and symptoms are improving. He states he was doing reasonably well with his knee after last completing viscosupplementation with Orthovisc to the right knee on 5/20/2020. He was able to get through summer and fall in the majority of wintered but towards the end of April 2021 he did experience a flare of his underlying knee osteoarthritis. He did take a Medrol Dosepak which is helped his symptoms substantially. Denies locking catching or true instability symptoms and does take Aleve episodically. As he has gotten to golf season he has had occasional soreness but at maximum is only about a 2-3 out of 10. He is played at least 15 rounds this year and is interested in pursuing viscosupplementation once again potentially. He would like to avoid surgical invention as long as possible.     Pain Assessment  Location of Pain: Knee  Location Modifiers: Right  Severity of Pain: 3  Quality of Pain: Dull, Aching  Duration of Pain: Persistent  Frequency of Pain: Constant  Aggravating Factors: Kneeling, Squatting, Stairs  Limiting Behavior: Yes  Result of Injury: Yes  Work-Related Injury: No  Are there other pain locations you wish to document?: No     Attest: I have reviewed and attest the documentation of the HPI documented by my . I will make any changes if necessary. Enc Date: 2021  Time: 2:06 PM  Provider: Shawn Potts MD        Social History     Tobacco Use    Smoking status: Current Some Day Smoker     Types: Cigars     Last attempt to quit: 2012     Years since quittin.2    Smokeless tobacco: Never Used   Substance Use Topics    Alcohol use: No     Alcohol/week: 0.0 standard drinks     Comment: quit drinking in 2015 due to medication    Drug use: No        Review of Systems  Pertinent items are noted in HPI  Review of systems reviewed from Patient History Form dated on 2021 and available in the patient's chart under the Media tab. Vital Signs     Ht 6' (1.829 m)   Wt 185 lb (83.9 kg)   BMI 25.09 kg/m²       General Exam:   Constitutional: Patient is adequately groomed with no evidence of malnutrition  DTRs: Deep tendon reflexes are intact  Mental Status: The patient is oriented to time, place and person. The patient's mood and affect are appropriate. Lymphatic: The lymphatic examination bilaterally reveals all areas to be without enlargement or induration. Vascular: Examination reveals no swelling or calf tenderness. Peripheral pulses are palpable and 2+. Neurological: The patient has good coordination. There is no weakness or sensory deficit.       Right knee Examination  Inspection:  He does have provements in his previous mild swelling to his knee with some patellofemoral crepitation but no high-grade deformities.     Palpation:  He does have recurrent  less prominent tenderness medial and lateral patellofemoral facet and mild pain with patellar grind testing was noted.  He does have diffuse anterior posterior 3rd medial joint line tenderness with less pain with lateral joint line palpation.     Rang of Motion:  He is stiff at terminal 5° of extension with flexion to about 120 today.  Hamstrings are tight.       Strength:  He does have some weakness at 4 out of 5 with the flexion and extension.       Special Tests:  He now has only mild pain with patellar grind test.  Mild pain with medial Emily's but no high-grade click.  I do not sense high-grade instability and a screening hip testing is benign.       Skin: There are no rashes, ulcerations or lesions.   Distal motor sensory and vascular exam is intact.     Gait: Improved gait.  He is status post total ankle arthroplasty in the past by Dr. Lloyd Hall.     Reflex symmetrically preserved.     Additional Comments:   Left knee examination does reveal a trace knee joint effusion with patellofemoral crepitation.  He does have a mildly positive grind test.  He does have some mild anterior 3rd medial joint line tenderness.  Most of his knee pain seems to be reproduced with patellar grind testing.  There does not appear to be high-grade instability.  His hamstrings are somewhat tight.  He is able to completely extend with flexion 140.  Negative Emily's testing.  Negative stability testing.     Additional Examinations:       Contralateral Exam: Examination of the right ankle reveals intact skin.  There is no focal tenderness or swelling.  The patient demonstrates full painless range of motion with regards to plantarflexion, dorsiflexion, inversion, eversion.  Strength is 5/5 thorough out all planes.  Ligamentous stability is grossly intact.   Examination of the right knee reveals intact skin.  There is no focal tenderness.  The patient demonstrates full painless range of motion with regards to flexion and extension.  Strength is 5/5 thorough out all planes.  Ligamentous stability is grossly intact.     Right Lower Extremity: Examination of the right lower extremity does not show any tenderness, deformity or injury.  Range of motion is unremarkable. Chrissy Chester is no gross instability.  There are no rashes, ulcerations or lesions.  Strength and tone are normal.  Left Lower Extremity: Examination of the left lower extremity does not show any tenderness, deformity or injury.  Range of motion is unremarkable. Montville Peer is no gross instability.  There are no rashes, ulcerations or lesions.  Strength and tone are normal.  Lower Back: Examination of the lower back does not show any tenderness, deformity or injury.  Range of motion is unremarkable. Montville Peer is no gross instability.  There are no rashes, ulcerations or lesions.  Strength and tone are normal.        Diagnostic Test Findings:     MRI right knee obtained 2019 as listed above     Narrative   Site: [employer] [facility] #: [id number] #: [id number] Procedure: MR Left Knee w/o Contrast ; Reason for Exam: eval oa/cmp. r/o remnant mmt. primary osteoarthritis of right knee. chronic pain of right knee. This document is confidential medical information.  Unauthorized disclosure or use of this information is prohibited by law. If you are not the intended recipient of this document, please advise us by calling immediately [phone].       [facility]   [place] [address]           Patient Name: Elian May   Case ID: [id number]   Patient : 1950   Referring Physician: Christiano Massey MD   Exam Date: 2019   Exam Description: MR Left Knee w/o Contrast            HISTORY:  Evaluate OA/CMP.  Evaluate for remnant medial meniscus tear. Primary osteoarthritis    of right knee. Chronic pain of right knee.       TECHNICAL FACTORS:  Long- and short-axis fat- and water-weighted images were performed.  1.5    High Field Oval.        COMPARISON:  Prior right knee MRI May 15, 2012.       FINDINGS:  Fissuring and fraying involves the superficial patellar cartilage.       ACL, PCL, MCL, flexor mechanism, and extensor mechanism are intact.       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  Prior study demonstrated interstitial tear of the MCL and    mineralization in this region as well.       Medial meniscus is small and has been partially resected.  Remnant tearing is present.  Lateral    meniscus body and free edge fraying.  Meniscectomy change is suspected.       Dissecting Baker's cyst is noted posteromedially.  Intermediate-grade-to-high-grade chondral    irregularity involves the weight and nonweightbearing medial femoral condyle and medial tibia.     ACL and  PCL sheath thickening is present.       CONCLUSION:   1. Interval partial medial and lateral meniscectomy.  Remnant tearing involves the posterior    horn-body remnant.  High-grade chondromalacia involves the weight and nonweightbearing medial    femoral condyle and less so medial tibia. 2. Intermediate-grade-to-high-grade patellofemoral chondromalacia. 3. ACL and PCL sheath thickening or sheath synovitis.  No tear is demonstrated. 4. Dissecting septated, leaking or dehisced Baker's cyst posteromedially.  Synovial thickening    is present within the Baker's cyst.  Semimembranosus bursal inflammation is contributory. 5. Chronic MCL sprain and scarring.  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 6. Please see above.                       Thank you for the opportunity to provide your interpretation.       Joe Gay MD       A: Ryan 04/12/2019 9:51 AM   T: YAZMIN 04/12/2019 9:01 AM                 Assessment & Plan:    Encounter Diagnoses   Name Primary?  Primary osteoarthritis of right knee Yes    Chondromalacia patellae of right knee     Acute pain of right knee        Orders Placed This Encounter   Procedures    ORTHOVISC INJECTION (For Auth/Precert)     RIGHT KNEE     Standing Status:   Future     Standing Expiration Date:   6/2/2022         Treatment Plan:  Treatment options were discussed with Ambar Porter.    We did once again review his exam and his previous right knee MRI film findings. Robbin Styles have medial and anterior compartment osteoarthritis to his right knee and is about 6+ years out from his left knee arthroscopy with medial and lateral meniscectomy.  He does appear to have a small remnant tear to the medial meniscus but I think the majority the pain is likely related to his  high-grade medial anterior compartment osteo-arthritic change.    He did have consultation with Dr. Leticia Jurado in the past who recommended against surgical intervention. Overall he was doing very well following completion of his viscosupplementation to his right knee which was completed on 5/20/2020 up until the end of April 2021 when she did have a flare for his underlying patellofemoral arthropathy and knee osteoarthritis. This did improve with a Medrol pack and at most his pain symptoms are only 2-3 out of 10. He does not believe they warrant an intra-articular steroid injection but would like to consider viscosupplementation once again. He will continue with his exercise program was given a refill on his diclofenac 75 mg 1 pill twice daily. We will see him back next week or 2 to consider Orthovisc injections I will continue with his home-based exercise program and periodic bracing. He will contact us in the interim with questions or concerns.          This dictation was performed with a verbal recognition program (DRAGON) and it was checked for errors. It is possible that there are still dictated errors within this office note. If so, please bring any errors to my attention for an addendum. All efforts were made to ensure that this office note is accurate.

## 2021-06-07 ENCOUNTER — TELEPHONE (OUTPATIENT)
Dept: ORTHOPEDIC SURGERY | Age: 71
End: 2021-06-07

## 2021-06-07 NOTE — TELEPHONE ENCOUNTER
06/07/2021  [address]  (SERIES OF 3) & [address] ADMIN CODE. RIGHT  KNEE. [address] #   [id number]. AUTHORIZATION # [id number]. DATES:  06/07/2021 - 09/07/2021. PER [place] ONLINE FOR  [employer].  AP

## 2021-06-11 ENCOUNTER — TELEPHONE (OUTPATIENT)
Dept: ORTHOPEDIC SURGERY | Age: 71
End: 2021-06-11

## 2021-06-28 ENCOUNTER — OFFICE VISIT (OUTPATIENT)
Dept: ORTHOPEDIC SURGERY | Age: 71
End: 2021-06-28
Payer: COMMERCIAL

## 2021-06-28 DIAGNOSIS — M25.561 ACUTE PAIN OF RIGHT KNEE: ICD-10-CM

## 2021-06-28 DIAGNOSIS — M22.41 CHONDROMALACIA PATELLAE OF RIGHT KNEE: ICD-10-CM

## 2021-06-28 DIAGNOSIS — M17.11 PRIMARY OSTEOARTHRITIS OF RIGHT KNEE: Primary | ICD-10-CM

## 2021-06-28 PROCEDURE — 99213 OFFICE O/P EST LOW 20 MIN: CPT | Performed by: FAMILY MEDICINE

## 2021-06-28 PROCEDURE — 20610 DRAIN/INJ JOINT/BURSA W/O US: CPT | Performed by: FAMILY MEDICINE

## 2021-06-28 NOTE — PROGRESS NOTES
patient's chart under the Media tab. Vital Signs     There were no vitals taken for this visit. General Exam:   Constitutional: Patient is adequately groomed with no evidence of malnutrition  DTRs: Deep tendon reflexes are intact  Mental Status: The patient is oriented to time, place and person. The patient's mood and affect are appropriate. Lymphatic: The lymphatic examination bilaterally reveals all areas to be without enlargement or induration. Vascular: Examination reveals no swelling or calf tenderness. Peripheral pulses are palpable and 2+. Neurological: The patient has good coordination. There is no weakness or sensory deficit.       Right knee Examination  Inspection:  He does have improvements in his previous mild swelling to his knee with some patellofemoral crepitation but no high-grade deformities.     Palpation:  He does have recurrent mild tenderness medial and lateral patellofemoral facet and mild pain with patellar grind testing was noted.  He does have diffuse anterior posterior 3rd medial joint line tenderness with less pain with lateral joint line palpation.     Rang of Motion:  He is stiff at terminal 5° of extension with flexion to about 120 today.  Hamstrings are tight.       Strength:  He does have some weakness at 4 out of 5 with the flexion and extension.       Special Tests:  He does have only mild pain with patellar grind test.  Only mild residual pain with medial Emily's but no high-grade click.  I do not sense high-grade instability and a screening hip testing is benign.       Skin: There are no rashes, ulcerations or lesions.   Distal motor sensory and vascular exam is intact.     Gait: Improved gait.  He is status post total ankle arthroplasty in the past by Dr. Rocco Sun.     Reflex symmetrically preserved.     Additional Comments:   Left knee examination does reveal a trace knee joint effusion with patellofemoral crepitation.  He does have a mildly positive grind test. [facility] does have some mild anterior 3rd medial joint line tenderness.  Most of his knee pain seems to be reproduced with patellar grind testing.  There does not appear to be high-grade instability.  His hamstrings are somewhat tight.  He is able to completely extend with flexion 140.  Negative Emily's testing.  Negative stability testing.     Additional Examinations:       Contralateral Exam: Examination of the right ankle reveals intact skin.  There is no focal tenderness or swelling.  The patient demonstrates full painless range of motion with regards to plantarflexion, dorsiflexion, inversion, eversion.  Strength is 5/5 thorough out all planes.  Ligamentous stability is grossly intact. Examination of the right knee reveals intact skin.  There is no focal tenderness.  The patient demonstrates full painless range of motion with regards to flexion and extension.  Strength is 5/5 thorough out all planes.  Ligamentous stability is grossly intact.     Right Lower Extremity: Examination of the right lower extremity does not show any tenderness, deformity or injury.  Range of motion is unremarkable. Stephania Copper is no gross instability.  There are no rashes, ulcerations or lesions.  Strength and tone are normal.  Left Lower Extremity: Examination of the left lower extremity does not show any tenderness, deformity or injury.  Range of motion is unremarkable. Stephania Copper is no gross instability.  There are no rashes, ulcerations or lesions.  Strength and tone are normal.  Lower Back: Examination of the lower back does not show any tenderness, deformity or injury.  Range of motion is unremarkable. Stephania Copper is no gross instability.  There are no rashes, ulcerations or lesions.  Strength and tone are normal.        Diagnostic Test Findings:     MRI right knee obtained 4/12/2019 as listed above     Narrative   Site: [facility] #: [id number] #: [id number] Procedure: MR Left Knee w/o Contrast ; Reason for Exam: eval oa/cmp. r/o remnant mmt. primary osteoarthritis of right knee. chronic pain of right knee. This document is confidential medical information.  Unauthorized disclosure or use of this information is prohibited by law. If you are not the intended recipient of this document, please advise us by calling immediately [phone].       [facility] [address]           Patient Name: Emily Solomon   Case ID: [id number]   Patient : 1950   Referring Physician: Clarita Moore MD   Exam Date: 2019   Exam Description: MR Left Knee w/o Contrast            HISTORY:  Evaluate OA/CMP.  Evaluate for remnant medial meniscus tear. Primary osteoarthritis    of right knee. Chronic pain of right knee.       TECHNICAL FACTORS:  Long- and short-axis fat- and water-weighted images were performed. 1.5    High Field Oval.        COMPARISON:  Prior right knee MRI May 15, 2012.       FINDINGS:  Fissuring and fraying involves the superficial patellar cartilage.       ACL, PCL, MCL, flexor mechanism, and extensor mechanism are intact.       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  Prior study demonstrated interstitial tear of the MCL and    mineralization in this region as well.       Medial meniscus is small and has been partially resected.  Remnant tearing is present.  Lateral    meniscus body and free edge fraying.  Meniscectomy change is suspected.       Dissecting Baker's cyst is noted posteromedially.  Intermediate-grade-to-high-grade chondral    irregularity involves the weight and nonweightbearing medial femoral condyle and medial tibia.     ACL and  PCL sheath thickening is present.       CONCLUSION:   1.  Interval partial medial and lateral meniscectomy.  Remnant tearing involves the posterior    horn-body remnant.  High-grade chondromalacia involves the weight and nonweightbearing medial    femoral condyle and less so medial tibia. 2. Intermediate-grade-to-high-grade patellofemoral chondromalacia. 3. ACL and PCL sheath thickening or sheath synovitis.  No tear is demonstrated. 4. Dissecting septated, leaking or dehisced Baker's cyst posteromedially.  Synovial thickening    is present within the Baker's cyst.  Semimembranosus bursal inflammation is contributory. 5. Chronic MCL sprain and scarring.  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 6. Please see above.                       Thank you for the opportunity to provide your interpretation.       Jason Seo MD       A: JERARDO/yazmin 04/12/2019 9:51 AM   T: YAZMIN 04/12/2019 9:01 AM                 Assessment & Plan:    Encounter Diagnoses   Name Primary?  Primary osteoarthritis of right knee Yes    Chondromalacia patellae of right knee     Acute pain of right knee        Orders Placed This Encounter   Procedures    95644 - NV DRAIN/INJECT LARGE JOINT/BURSA         Treatment Plan:  Treatment options were discussed with Daria Hoover. We did once again review his exam and recent right knee MRI film findings. Garcia Adams have medial and anterior compartment osteoarthritis to his right knee and is about 6+ years out from his left knee arthroscopy with medial and lateral meniscectomy.  He does appear to have a small remnant tear to the medial meniscus but I think the majority the pain is likely related to his  high-grade medial anterior compartment osteo-arthritic change.    He did have consultation with Dr. Compa Gamble who recommended against surgical intervention. Is doing reasonably well over the winter but is been having some increasing soreness. His last round of Visco supplementation was completed on 11/4/2019 and he does believe he is ready to repeat this once again would like avoid surgical intervention if at all possible.   After discussion of pros and cons of Visco supplementation he did receive his first injection of Orthovisc to his right knee. This was performed using a standard prefilled 2 cc syringe. He may continue with his wraparound knee brace and his home-based exercise program as well as his anti-inflammatory medications. He will be seen back each of the next 2 weeks to continue with his Visco supplementation series. He will contact us in the interim with questions or concerns.             This dictation was performed with a verbal recognition program (DRAGON) and it was checked for errors. It is possible that there are still dictated errors within this office note. If so, please bring any errors to my attention for an addendum. All efforts were made to ensure that this office note is accurate.

## 2021-07-07 ENCOUNTER — OFFICE VISIT (OUTPATIENT)
Dept: ORTHOPEDIC SURGERY | Age: 71
End: 2021-07-07
Payer: COMMERCIAL

## 2021-07-07 DIAGNOSIS — M25.561 ACUTE PAIN OF RIGHT KNEE: ICD-10-CM

## 2021-07-07 DIAGNOSIS — M22.41 CHONDROMALACIA PATELLAE OF RIGHT KNEE: ICD-10-CM

## 2021-07-07 DIAGNOSIS — M17.11 PRIMARY OSTEOARTHRITIS OF RIGHT KNEE: Primary | ICD-10-CM

## 2021-07-07 PROCEDURE — 20610 DRAIN/INJ JOINT/BURSA W/O US: CPT | Performed by: FAMILY MEDICINE

## 2021-07-12 ENCOUNTER — OFFICE VISIT (OUTPATIENT)
Dept: ORTHOPEDIC SURGERY | Age: 71
End: 2021-07-12
Payer: COMMERCIAL

## 2021-07-12 DIAGNOSIS — M22.41 CHONDROMALACIA PATELLAE OF RIGHT KNEE: ICD-10-CM

## 2021-07-12 DIAGNOSIS — M17.11 PRIMARY OSTEOARTHRITIS OF RIGHT KNEE: Primary | ICD-10-CM

## 2021-07-12 DIAGNOSIS — M25.561 ACUTE PAIN OF RIGHT KNEE: ICD-10-CM

## 2021-07-12 PROCEDURE — 20610 DRAIN/INJ JOINT/BURSA W/O US: CPT | Performed by: FAMILY MEDICINE

## 2021-07-12 NOTE — PROGRESS NOTES
CC:  FU Knee Osteoarthritis with Viscosupplementation       HPI:  Pedrito Dale is a 70 y.o. 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 Sandra Faulkner who is seen today for follow-up on his high-grade medial and anterior compartment osteoarthritis with remnant tearing medial meniscus which is not mechanical in nature with knee synovitis and chronic MCL sprain who is being seen today to continue with his Orthovisc injection to his right knee. Clinically he is feeling better following his first 2 injections has been working his home-based exercise program has been using his brace. He still has some discomfort with repetitive stairclimbing and distance walking but this seems to be improving. Denies left true locking catching or instability symptoms. PE: no substantial change in exam.    Assessment:  Knee Osteoarthritis with viscosupplementation      PROCEDURE NOTE:    PRE-PROCEDURE DIAGNOSIS: DJD knee    POST-PROCEDURE DIAGNOSIS: DJD knee    Injection #3 of Orthovisc. PROCEDURE:  With the patient's permission, his right knee was prepped in standard sterile fashion with Betadine and Alcohol and the prefilled 2cc injection of Orthovisc was injected intra-articularly into the right knee via a superolateral approach without difficulty. The patient tolerated this well without difficulty. A band-aid was applied. POST-PROCEDURE INSTRUCTIONS GIVEN TO PATIENT: The patient was advised to ice the knee for 15-20 minutes to relieve any injection site related pain. FOLLOW-UP: as directed.   He will continue with his diclofenac 75 mg 1 pill twice daily as well as use of his brace and his home-based exercise program.  He is aware that he can have repeat viscosupplementation at 6-month intervals or even an interim steroid injection as he does anticipate going to [facility] in October 2021 and will be g[place]. He will contact us in the interim with questions or concerns.

## 2021-09-06 ENCOUNTER — APPOINTMENT (OUTPATIENT)
Dept: GENERAL RADIOLOGY | Age: 71
End: 2021-09-06
Payer: MEDICARE

## 2021-09-06 ENCOUNTER — HOSPITAL ENCOUNTER (EMERGENCY)
Age: 71
Discharge: HOME | End: 2021-09-06
Payer: MEDICARE

## 2021-09-06 VITALS
OXYGEN SATURATION: 96 % | SYSTOLIC BLOOD PRESSURE: 160 MMHG | WEIGHT: 182 LBS | RESPIRATION RATE: 18 BRPM | BODY MASS INDEX: 24.68 KG/M2 | TEMPERATURE: 98.4 F | DIASTOLIC BLOOD PRESSURE: 97 MMHG | HEART RATE: 69 BPM

## 2021-09-06 DIAGNOSIS — S82.891A CLOSED AVULSION FRACTURE OF RIGHT ANKLE, INITIAL ENCOUNTER: Primary | ICD-10-CM

## 2021-09-06 PROCEDURE — 99283 EMERGENCY DEPT VISIT LOW MDM: CPT

## 2021-09-06 PROCEDURE — 73610 X-RAY EXAM OF ANKLE: CPT

## 2021-09-06 RX ORDER — DICLOFENAC SODIUM 75 MG/1
75 TABLET, DELAYED RELEASE ORAL 2 TIMES DAILY
Qty: 14 TABLET | Refills: 0 | Status: SHIPPED | OUTPATIENT
Start: 2021-09-06 | End: 2021-09-22 | Stop reason: ALTCHOICE

## 2021-09-06 ASSESSMENT — PAIN SCALES - GENERAL: PAINLEVEL_OUTOF10: 9

## 2021-09-08 ENCOUNTER — OFFICE VISIT (OUTPATIENT)
Dept: ORTHOPEDIC SURGERY | Age: 71
End: 2021-09-08
Payer: COMMERCIAL

## 2021-09-08 ENCOUNTER — TELEPHONE (OUTPATIENT)
Dept: ORTHOPEDIC SURGERY | Age: 71
End: 2021-09-08

## 2021-09-08 VITALS — HEIGHT: 72 IN | WEIGHT: 182 LBS | BODY MASS INDEX: 24.65 KG/M2

## 2021-09-08 DIAGNOSIS — S82.54XS CLOSED NONDISPLACED FRACTURE OF MEDIAL MALLEOLUS OF RIGHT TIBIA, SEQUELA: Primary | ICD-10-CM

## 2021-09-08 DIAGNOSIS — M25.571 ACUTE RIGHT ANKLE PAIN: ICD-10-CM

## 2021-09-08 DIAGNOSIS — S93.401A SPRAIN OF RIGHT ANKLE, UNSPECIFIED LIGAMENT, INITIAL ENCOUNTER: ICD-10-CM

## 2021-09-08 PROCEDURE — 99214 OFFICE O/P EST MOD 30 MIN: CPT | Performed by: FAMILY MEDICINE

## 2021-09-08 RX ORDER — CELECOXIB 200 MG/1
200 CAPSULE ORAL DAILY
Qty: 30 CAPSULE | Refills: 3 | Status: SHIPPED | OUTPATIENT
Start: 2021-09-08

## 2021-09-08 NOTE — PROGRESS NOTES
Chief Complaint    Initial Consultation Ankle Pain (OPNP R ANKLE)    Initial consultation acute right medial greater than lateral ankle pain with swelling status post inversion    History of Present Illness:  Jak Eaton is a 70 y.o. 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 Suzi Harden who is seen today in follow-up from [facility] for evaluation of an acute injury to the right ankle. Apparently on 9/5/2021 he was going up his stairs at home carrying a laundry basket when the handle broke and he was on the second stair and fell backwards sustaining an inversion injury to his right ankle. He is uncertain as to whether or not there was a pop or crack. He opted to simply go to sleep and the next day woke up with increasing pain and swelling to the medial aspect of his ankle which did prompt an emergency room visit to [facility] where x-rays did reveal an acute appearing avulsion to the medial malleolus consistent with deltoid spraining. Unfortunate they only placed him in a Aircast which is causing more pain than helpfulness and is having pain at 8-9 out of 10 with bearing of weight. At rest it is more of a 3-4 and achy and has had swelling medially. He has had minimal ecchymosis. He has been taking only extra strength Tylenol. He has been seen today for orthopedic and sports consultation review of his imaging.           Pain Assessment  Location of Pain: Ankle  Location Modifiers: Right  Severity of Pain: 9  Quality of Pain: Sharp  Duration of Pain: Persistent  Frequency of Pain: Constant  Aggravating Factors: Stairs, Walking, Standing  Limiting Behavior: Yes  Relieving Factors: Rest  Result of Injury: Yes  Work-Related Injury: No  Are there other pain locations you wish to document?: No      Medical History    Patient's medications, allergies, past medical, surgical, social and family histories were reviewed and updated as appropriate. Review of Systems    Pertinent items are noted in HPI  Review of systems reviewed from Patient History Form dated on 9/8/2021 and available in the patient's chart under the Media tab. Vital Signs  There were no vitals filed for this visit. General Exam:     Constitutional: Patient is adequately groomed with no evidence of malnutrition  DTRs: Deep tendon reflexes are intact  Mental Status: The patient is oriented to time, place and person. The patient's mood and affect are appropriate. Lymphatic: The lymphatic examination bilaterally reveals all areas to be without enlargement or induration. Vascular: Examination reveals no swelling or calf tenderness. Peripheral pulses are palpable and 2+. Neurological: The patient has good coordination. There is no weakness or sensory deficit. Ankle Examination  Inspection: He does have obvious 1+ swelling more medially than laterally. Palpation: Clinical tenderness over the medial malleolus and also over the deltoid ligament and posterior tibialis tendon. Rang of Motion: 75% reduction in ankle motion. Strength: Pain associated weakness with resisted inversion dorsiflexion 4- out of 5. Special Tests: Drawer testing not tested but negative talar tilt and Irwin's testing. Skin: There are no rashes, ulcerations or lesions. Distal motor sensory and vascular exam is intact. Gait: Moderate altalgia. Reflex symmetrically preserved. Additional Comments:       Additional Examinations:       Contralateral Exam: Examination of the left ankle reveals intact skin. There is no focal tenderness or swelling. The patient demonstrates full painless range of motion with regards to plantarflexion, dorsiflexion, inversion, eversion. Strength is 5/5 thorough out all planes. Ligamentous stability is grossly intact.     Right Lower Extremity: Examination of the right lower extremity does not show any tenderness, deformity or injury. Range of motion is unremarkable. There is no gross instability. There are no rashes, ulcerations or lesions. Strength and tone are normal.  Left Lower Extremity: Examination of the left lower extremity does not show any tenderness, deformity or injury. Range of motion is unremarkable. There is no gross instability. There are no rashes, ulcerations or lesions. Strength and tone are normal.        Diagnostic Test Findings:     Right ankle AP lateral mortise films were reviewed from the emergency room and does appear to have an acute appearing avulsion off the medial malleolus. There is no high-grade syndesmotic or mortise widening. Assessment : #1.  3 days status post right ankle deltoid ligament sprain with medial malleolar avulsion fracture with ankle pain posterior tib strain       Impression:    Encounter Diagnoses   Name Primary?  Closed nondisplaced fracture of medial malleolus of right tibia, sequela Yes    Acute right ankle pain     Sprain of right ankle, unspecified ligament, initial encounter        Office Procedures:    Orders Placed This Encounter   Procedures    Breg Tall Jyoti Walking Boot     Patient was prescribed a Breg Tall Jyoti Walking Boot. The right ANKLE will require stabilization / immobilization from this semi-rigid / rigid orthosis to improve their function. The orthosis will assist in protecting the affected area, provide functional support and facilitate healing. Patient was instructed to progress ambulation weight bearing as tolerated in the device. The patient was educated and fit by a healthcare professional with expert knowledge and specialization in brace application while under the direct supervision of the physician. Verbal and written instructions for the use of and application of this item were provided.    They were instructed to contact the office immediately should the brace result in increased pain, decreased sensation, increased swelling or worsening of the condition. Treatment Plan:  Treatment options were discussed with Lazarus Myrtle. We did review his plain films and exam findings. He is now 3 days out from a deltoid ligament sprain with medial malleolar avulsion. He was converted from his Aircast to a walking boot. We did place him in a boot however he will check his boot at home and return this if his old boot is in good shape. We also placed him on Celebrex 200 mg daily and I want him to ice and elevate. He may use his cane or crutch. We will allow him to calm down for couple weeks and see him back for repeat ankle films and to see if we can start some early motion. He will contact us in the interim with questions or concerns. This dictation was performed with a verbal recognition program (DRAGON) and it was checked for errors. It is possible that there are still dictated errors within this office note. If so, please bring any errors to my attention for an addendum. All efforts were made to ensure that this office note is accurate.

## 2021-09-08 NOTE — LETTER
[address] [place] [address] [facility] [address]  Phone: [phone]  Fax: [phone]    Vanessa Rivers MD        September 8, 2021     Patient: Jono Julian   YOB: 1950   Date of Visit: 9/8/2021       To Whom it May Concern:    Tyler Howard was seen in my clinic on 9/8/2021. He will need to be off of work for the next 2 weeks until seen back for follow up appointment. If you have any questions or concerns, please don't hesitate to call.     Sincerely,         Vanessa Rivers MD

## 2021-09-08 NOTE — TELEPHONE ENCOUNTER
General Question     Subject: PATIENT WAS GIVEN A BOOT IN THE OFFICE AND ALREADY HAS ONE AT HOME.  WOULD LIKE TO KNOW IF HE CAN RETURN THE ONE RECEIVED IN THE OFFICE.   Patient: Damien Lionelpurnima"   Contact Number: [phone]

## 2021-09-08 NOTE — TELEPHONE ENCOUNTER
RETURNED PATIENTS CALL AND LEFT MESSAGE STATING THAT PATIENT CAN GO AHEAD AND RETURN THE BOOT TO THE OFFICE AND HE MAY WEAR THE BOOT HE ALREADY HAS AT HOME.

## 2021-09-11 NOTE — ED PROVIDER NOTES
[address] [place] [address]  ED  EMERGENCY DEPARTMENT ENCOUNTER      This patient was not seen and evaluated by the attending physician. Pt Name: Ti Rollins  MRN: [id number]  [place] 1950  Date of evaluation: 9/6/2021  Provider: ION Case - CNP-C  PCP: Brian SINGH      History provided by the patient. CHIEFCOMPLAINT:     Chief Complaint   Patient presents with    Fall     injured right ankle on steps last night       HISTORY OF PRESENT ILLNESS:      Ti Rollins is a 70 y.o. male who presents to [address] [place] [address]  ED with complaints of fall. Patient states that he fell injuring his right ankle on some steps last night. Patient denies any other injury. He is resting up in the bed, no obvious deformity, he is here for further evaluation. LOCATION:right ankle  QUALITY:ache  SEVERITY:9  DURATION:today  MODIFYING FACTORS:worse with movement    Nursing Notes were reviewed     REVIEW OF SYSTEMS:     Review of Systems  All systems, a total of 10, are reviewed and negative except for those that were just noted in history present illness.         PAST MEDICAL HISTORY:     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    Polymyalgia ([address] 2014    Rheumatoid arthritis ([address] 2015    Treated with MTX    Wears glasses     reading         SURGICAL HISTORY:      Past Surgical History:   Procedure Laterality Date    ANKLE SURGERY Left 09/26/2016    LEFT TOTAL ANKLE REPLACEMENT, ENDOSCOPIC GASTROC LENGTHENING    ARTERY BIOPSY Bilateral     TEMPORAL    BACK SURGERY      x 2    CHOLECYSTECTOMY  ~2005    COLONOSCOPY  03/21/10    Hyperplastic Polyps    COLONOSCOPY  03/30/05    Tubular Adenoma and Hyperplastic polyps    COLONOSCOPY  03/26/10    Tubular Adenoma and Hyperplastic polyps    COLONOSCOPY 8/14/15    polyps    HERNIA REPAIR Right     INGUINAL    KNEE ARTHROSCOPY Right          CURRENT MEDICATIONS:       Discharge Medication List as of 2021 10:05 AM      CONTINUE these medications which have NOT CHANGED    Details   !! diclofenac (VOLTAREN) 75 MG EC tablet Take 1 tablet by mouth 2 times daily, Disp-60 tablet, R-3Normal      !! diclofenac (VOLTAREN) 75 MG EC tablet Take 1 tablet by mouth 2 times daily, Disp-60 tablet, R-3Normal      aspirin 81 MG tablet Take 81 mg by mouth daily      aspirin 325 MG EC tablet Take 1 tablet by mouth daily, Disp-30 tablet, R-0      atorvastatin (LIPITOR) 40 MG tablet Take 40 mg by mouth nightly      etanercept (ENBREL) 50 MG/ML injection Inject 25 mg into the skin once Indications: WEEKLY ON FRIDAY      calcium carbonate (OSCAL) 500 MG TABS tablet Take 500 mg by mouth daily      Omega-3 Fatty Acids (FISH OIL) 1200 MG CAPS Take 1 capsule by mouth daily      lisinopril-hydrochlorothiazide (ZESTORETIC) 20-12.5 MG per tablet Take by mouth       !! - Potential duplicate medications found. Please discuss with provider. ALLERGIES:    Patient has no known allergies.     FAMILY HISTORY:       Family History   Problem Relation Age of Onset    High Blood Pressure Mother     Cancer Father 79        Colon          SOCIAL HISTORY:     Social History     Socioeconomic History    Marital status:      Spouse name: None    Number of children: None    Years of education: None    Highest education level: None   Occupational History    None   Tobacco Use    Smoking status: Current Some Day Smoker     Types: Cigars     Last attempt to quit: 2012     Years since quittin.5    Smokeless tobacco: Never Used   Substance and Sexual Activity    Alcohol use: No     Alcohol/week: 0.0 standard drinks     Comment: quit drinking in 2015 due to medication    Drug use: No    Sexual activity: None   Other Topics Concern    None   Social History Narrative    None Social Determinants of Health     Financial Resource Strain:     Difficulty of Paying Living Expenses:    Food Insecurity:     Worried About Running Out of Food in the Last Year:     [address] in the Last Year:    Transportation Needs:     Lack of Transportation (Medical):  Lack of Transportation (Non-Medical):    Physical Activity:     Days of Exercise per Week:     Minutes of Exercise per Session:    Stress:     Feeling of Stress :    Social Connections:     Frequency of Communication with Friends and Family:     Frequency of Social Gatherings with Friends and Family:     Attends Baptism Services:     Active Member of Clubs or Organizations:     Attends Club or Organization Meetings:     Marital Status:    Intimate Partner Violence:     Fear of Current or Ex-Partner:     Emotionally Abused:     Physically Abused:     Sexually Abused:        SCREENINGS:             PHYSICAL EXAM:       ED Triage Vitals [09/06/21 0821]   BP Temp Temp Source Pulse Resp SpO2 Height Weight   (!) 160/97 98.4 °F (36.9 °C) Oral 69 18 96 % -- 182 lb (82.6 kg)       Physical Exam    CONSTITUTIONAL: Awake and alert. Cooperative. Well-developed. Well-nourished. Vitals:    09/06/21 0821   BP: (!) 160/97   Pulse: 69   Resp: 18   Temp: 98.4 °F (36.9 °C)   TempSrc: Oral   SpO2: 96%   Weight: 182 lb (82.6 kg)     HENT: Normocephalic. Atraumatic. External ears normal, without discharge. Nonasal discharge. Mucous membranes moist.  EYES: Conjunctiva non-injected, no lid abnormalities noted. No scleral icterus. EOM's grossly intact. Anterior chambers clear. NECK: Supple. Normal ROM. No meningismus. No thyroid tenderness or swelling noted. CARDIOVASCULAR: no tachycardia per vital signs. PULMONARY/CHEST WALL: Effort normal. No tachypnea. No audible adventitious breath sounds. ABDOMEN: No obvious abdominal distention, no obvious hernias. Back: Spine is midline.  No obvious trauma or outward signs of cauda equina  /ANORECTAL: Not assessed  MUSKULOSKELETAL: Normal ROM. Mild global tenderness noted to the right ankle without deformity. 2+ dorsalis pedis and posterior tibial pulses present. SKIN: Warm and dry. NEUROLOGICAL:  GCS 15. No obvious focal neurological deficits. PSYCHIATRIC: Normal affect, normal insight and judgement. Alert and oriented x 3. DIAGNOSTIC RESULTS:     LABS:    No results found for this visit on 09/06/21. RADIOLOGY:  All x-ray studies are viewed/reviewed by me. Formal interpretations per the radiologist are as follows:      XR ANKLE RIGHT (MIN 3 VIEWS)   Final Result   Possible small nondisplaced avulsion fracture at the medial aspect of the   medial malleolus. Diffuse soft tissue swelling. EKG:  See EKG interpretation by an attending physician. PROCEDURES:   N/A    CRITICAL CARE TIME:   N/A    CONSULTS:  None      EMERGENCY DEPARTMENT COURSE andDIFFERENTIAL DIAGNOSIS/MDM:   Vitals:    Vitals:    09/06/21 0821   BP: (!) 160/97   Pulse: 69   Resp: 18   Temp: 98.4 °F (36.9 °C)   TempSrc: Oral   SpO2: 96%   Weight: 182 lb (82.6 kg)       Patient wasgiven the following medications:  Medications - No data to display      Patient was evaluated independently by myself with the attending physician available for consultation. Patient presented to the emergency room today with complaints of right ankle pain. Radiographic imaging showed a possible  Small, nondisplaced avulsion fracture of the medial aspect of the medial malleolus. Patient was placed in an Ace wrap, instructed to rest, ice elevate, he is to follow-up with orthopedics. Patient agreed with this plan of care. No neurologic deficits. He was discharged home in good condition. Patient laboratory studies, radiographic imaging, and assessment were all discussed with the patient and/orpatient family.   There was shared decision-making between myself as well as the patient and/or their surrogate and we are all in agreement with discharge home. There was an opportunity for questions and all questions were answered tothe best of my ability and to the satisfaction of the patient and/or patient family. FINAL IMPRESSION:      1.  Closed avulsion fracture of right ankle, initial encounter          DISPOSITION/PLAN:   DISPOSITION Decision To Discharge      PATIENT REFERRED TO:  JUAN [address] [address]  [facility]  [phone]    Call   For follow up      DISCHARGE MEDICATIONS:  Discharge Medication List as of 9/6/2021 10:05 AM                     (Please note thatportions of this note were completed with a voice recognition program.  Efforts were made to edit the dictations, but occasionally words are mis-transcribed.)    ION Juárez - CNP-C (electronicallysigned)      ION Juárez CNP  09/10/21 2054

## 2021-09-22 ENCOUNTER — TELEPHONE (OUTPATIENT)
Dept: ORTHOPEDIC SURGERY | Age: 71
End: 2021-09-22

## 2021-09-22 ENCOUNTER — OFFICE VISIT (OUTPATIENT)
Dept: ORTHOPEDIC SURGERY | Age: 71
End: 2021-09-22
Payer: COMMERCIAL

## 2021-09-22 VITALS — WEIGHT: 182 LBS | HEIGHT: 72 IN | BODY MASS INDEX: 24.65 KG/M2

## 2021-09-22 DIAGNOSIS — S82.54XS CLOSED NONDISPLACED FRACTURE OF MEDIAL MALLEOLUS OF RIGHT TIBIA, SEQUELA: Primary | ICD-10-CM

## 2021-09-22 PROCEDURE — L1902 AFO ANKLE GAUNTLET PRE OTS: HCPCS | Performed by: FAMILY MEDICINE

## 2021-09-22 PROCEDURE — 99213 OFFICE O/P EST LOW 20 MIN: CPT | Performed by: FAMILY MEDICINE

## 2021-09-22 NOTE — LETTER
[address] [place] [address] [facility] [address]  Phone: [phone]  Fax: [phone]    Lucy Mark MD         September 22, 2021     Patient: Chidi Valenzuela   YOB: 1950   Date of Visit: 9/22/2021       To Whom It May Concern: It is my medical opinion that Maribel Vernon would medically benefit from a stair lift in his home secondary to an orthopedic condition. If you have any questions or concerns, please don't hesitate to call.     Sincerely,        Lucy Mark MD

## 2021-09-22 NOTE — PROGRESS NOTES
Chief Complaint     Ankle Pain (CK RIGHT ANKLE)    FU acute right medial greater than lateral ankle pain with swelling status post inversion with deltoid ligament spraining and medial malleolar avulsion fracture    History of Present Illness:  Pedrito Dale is a 70 y.o. 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 Sandra Faulkner who is seen today in follow-up from [facility] for evaluation of an acute injury to the right ankle. Apparently on 9/5/2021 he was going up his stairs at home carrying a laundry basket when the handle broke and he was on the second stair and fell backwards sustaining an inversion injury to his right ankle. He is uncertain as to whether or not there was a pop or crack. He opted to simply go to sleep and the next day woke up with increasing pain and swelling to the medial aspect of his ankle which did prompt an emergency room visit to [facility] where x-rays did reveal an acute appearing avulsion to the medial malleolus consistent with deltoid spraining. Unfortunate they only placed him in a Aircast which is causing more pain than helpfulness and is having pain at 8-9 out of 10 with bearing of weight. At rest it is more of a 3-4 and achy and has had swelling medially. He has had minimal ecchymosis. He has been taking only extra strength Tylenol. He has been seen today for orthopedic and sports consultation review of his imaging. Glenn was initially evaluated for his ankle on 9/8/2021 and is now 17 days out from his injury. Clinically he is doing much better and rates his improvement about 50 to 60%. He did not really tolerate his boot well as it was rubbing his medial ankle. Is trying to ice and elevate and is continued with his style Celebrex which she has tolerated.   He is trying some gentle stretching but still walks with a mildly antalgic gait and does have weakness and stiffness. Denies sensations of loose bodies locking or catching. He has been using an off-the-shelf wrap. Medical History    Patient's medications, allergies, past medical, surgical, social and family histories were reviewed and updated as appropriate. Review of Systems    Pertinent items are noted in HPI  Review of systems reviewed from Patient History Form dated on 9/8/2021 and available in the patient's chart under the Media tab. Vital Signs  There were no vitals filed for this visit. General Exam:     Constitutional: Patient is adequately groomed with no evidence of malnutrition  DTRs: Deep tendon reflexes are intact  Mental Status: The patient is oriented to time, place and person. The patient's mood and affect are appropriate. Lymphatic: The lymphatic examination bilaterally reveals all areas to be without enlargement or induration. Vascular: Examination reveals no swelling or calf tenderness. Peripheral pulses are palpable and 2+. Neurological: The patient has good coordination. There is no weakness or sensory deficit. Ankle Examination  Inspection: He does have improved trace residual more medially than laterally. Ecchymosis improving    Palpation: Slightly improved clinical tenderness over the medial malleolus and also over the deltoid ligament and posterior tibialis tendon. Rang of Motion: 75% reduction in ankle motion. Strength: Pain associated weakness with resisted inversion dorsiflexion 4- out of 5. Special Tests: Drawer testing not tested but negative talar tilt and Irwin's testing. Skin: There are no rashes, ulcerations or lesions. Distal motor sensory and vascular exam is intact. Gait: Mild to moderate altalgia. Reflex symmetrically preserved. Additional Comments:       Additional Examinations:       Contralateral Exam: Examination of the left ankle reveals intact skin.   There is no focal tenderness or his Celebrex. 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 We will see him back in 2 weeks for final set of x-rays and hopefully will start him in some therapy at that point. He will continue the Celebrex 200 mg daily Icing and activity modification was discussed. We did write them a note to consider a lift chair further stairs at home as both the patient and the wife do have substantial arthritic changes to the knee. They will contact us in the interim with questions or concerns. This dictation was performed with a verbal recognition program (DRAGON) and it was checked for errors. It is possible that there are still dictated errors within this office note. If so, please bring any errors to my attention for an addendum. All efforts were made to ensure that this office note is accurate.

## 2021-09-22 NOTE — LETTER
[address] [place] [address] [facility] [address]  Phone: [phone]  Fax: [phone]    Maral Hagen MD        September 22, 2021     Patient: Arlena Gaucher   YOB: 1950   Date of Visit: 9/22/2021       To Whom It May Concern: It is my medical opinion that Puja Irving may return to work on 9/22/21. If you have any questions or concerns, please don't hesitate to call.     Sincerely,        Maral Hagen MD

## 2021-09-22 NOTE — LETTER
[address] [place] [address] Dr [facility] [place] [address]  Phone: [phone]  Fax: [phone]    Johanna Hercules MD        September 22, 2021     Patient: Angie Martinez   YOB: 1950   Date of Visit: 9/22/2021       To Whom It May Concern: It is my medical opinion that Abigail Maldonado may return to light duty immediately with the following restrictions: lifting/carrying not to exceed 10-15 lbs. , pushing/pulling not to exceed 10-15 lbs. If you have any questions or concerns, please don't hesitate to call.     Sincerely,        Johanna Hercules MD

## 2021-10-06 ENCOUNTER — OFFICE VISIT (OUTPATIENT)
Dept: ORTHOPEDIC SURGERY | Age: 71
End: 2021-10-06
Payer: COMMERCIAL

## 2021-10-06 VITALS — HEIGHT: 72 IN | WEIGHT: 182 LBS | BODY MASS INDEX: 24.65 KG/M2

## 2021-10-06 DIAGNOSIS — S93.421D SPRAIN OF DELTOID LIGAMENT OF RIGHT ANKLE, SUBSEQUENT ENCOUNTER: ICD-10-CM

## 2021-10-06 DIAGNOSIS — S82.54XD CLOSED NONDISPLACED FRACTURE OF MEDIAL MALLEOLUS OF RIGHT TIBIA WITH ROUTINE HEALING, SUBSEQUENT ENCOUNTER: ICD-10-CM

## 2021-10-06 DIAGNOSIS — M25.571 ACUTE RIGHT ANKLE PAIN: Primary | ICD-10-CM

## 2021-10-06 PROBLEM — S93.401A SPRAIN OF RIGHT ANKLE: Status: ACTIVE | Noted: 2021-10-06

## 2021-10-06 PROBLEM — S82.54XA CLOSED NONDISPLACED FRACTURE OF MEDIAL MALLEOLUS OF RIGHT TIBIA: Status: ACTIVE | Noted: 2021-10-06

## 2021-10-06 PROCEDURE — 99213 OFFICE O/P EST LOW 20 MIN: CPT | Performed by: FAMILY MEDICINE

## 2021-10-06 NOTE — PROGRESS NOTES
Chief Complaint     Ankle Pain (CK RIGHT ANKLE)    FU acute right medial greater than lateral ankle pain with swelling status post inversion with deltoid ligament spraining and medial malleolar avulsion fracture    History of Present Illness:  Seema Carter is a 70 y.o. 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 Mario Aviles who is seen today in follow-up from [facility] for evaluation of an acute injury to the right ankle. Apparently on 9/5/2021 he was going up his stairs at home carrying a laundry basket when the handle broke and he was on the second stair and fell backwards sustaining an inversion injury to his right ankle. He is uncertain as to whether or not there was a pop or crack. He opted to simply go to sleep and the next day woke up with increasing pain and swelling to the medial aspect of his ankle which did prompt an emergency room visit to [facility] where x-rays did reveal an acute appearing avulsion to the medial malleolus consistent with deltoid spraining. Unfortunate they only placed him in a Aircast which is causing more pain than helpfulness and is having pain at 8-9 out of 10 with bearing of weight. At rest it is more of a 3-4 and achy and has had swelling medially. He has had minimal ecchymosis. He has been taking only extra strength Tylenol. He has been seen today for orthopedic and sports consultation review of his imaging. Glenn was initially evaluated for his ankle on 9/8/2021 and is now 17 days out from his injury. Clinically he is doing much better and rates his improvement about 50 to 60%. He did not really tolerate his boot well as it was rubbing his medial ankle. Is trying to ice and elevate and is continued with his style Celebrex which she has tolerated.   He is trying some gentle stretching but still walks with a mildly antalgic gait and does have weakness and stiffness. Denies sensations of loose bodies locking or catching. He has been using an off-the-shelf wrap. Glenn was last seen in the office on 9/22/2021 and continued on treatment for his medial malleolar avulsion fracture with deltoid sprain. Clinically he is doing much better. He has 90+ percent improvement support in his motion exercises. He has been able to return to work in his brace. He is now 4-1/2 weeks out from his injury and is here today for repeat evaluation with x-ray check. Medical History    Patient's medications, allergies, past medical, surgical, social and family histories were reviewed and updated as appropriate. Review of Systems    Pertinent items are noted in HPI  Review of systems reviewed from Patient History Form dated on 9/8/2021 and available in the patient's chart under the Media tab. Vital Signs  There were no vitals filed for this visit. General Exam:     Constitutional: Patient is adequately groomed with no evidence of malnutrition  DTRs: Deep tendon reflexes are intact  Mental Status: The patient is oriented to time, place and person. The patient's mood and affect are appropriate. Lymphatic: The lymphatic examination bilaterally reveals all areas to be without enlargement or induration. Vascular: Examination reveals no swelling or calf tenderness. Peripheral pulses are palpable and 2+. Neurological: The patient has good coordination. There is no weakness or sensory deficit. Ankle Examination  Inspection: He does have improved trace residual more medially than laterally. Ecchymosis improving    Palpation: Markedly improved clinical tenderness over the medial malleolus and also over the deltoid ligament which she rates it only about a 2-3 out of 10 and less tenderness over the posterior tibialis tendon. Rang of Motion: 25 % reduction in ankle motion. Strength: Mild weakness with resisted inversion dorsiflexion 4 out of 5.

## 2021-12-07 ENCOUNTER — TELEPHONE (OUTPATIENT)
Dept: ORTHOPEDIC SURGERY | Age: 71
End: 2021-12-07

## 2021-12-07 NOTE — TELEPHONE ENCOUNTER
Spoke with patient in regards to the [facility] joint pain program. Patient has seen Dr. Benjie Neal for his knee pain in the past and received multiple cortisone injections that have provided no relief. Patient stated he's been told he needs a joint replacement in the past and has an orthopaedic specialist for when he's ready. He can give us a call if there is anything further that we can do for him.

## 2022-01-03 DIAGNOSIS — M22.41 CHONDROMALACIA PATELLAE OF RIGHT KNEE: ICD-10-CM

## 2022-01-03 DIAGNOSIS — M25.561 ACUTE PAIN OF RIGHT KNEE: ICD-10-CM

## 2022-01-03 DIAGNOSIS — M17.11 PRIMARY OSTEOARTHRITIS OF RIGHT KNEE: Primary | ICD-10-CM

## 2022-01-12 ENCOUNTER — TELEPHONE (OUTPATIENT)
Dept: ORTHOPEDIC SURGERY | Age: 72
End: 2022-01-12

## 2022-01-12 NOTE — TELEPHONE ENCOUNTER
Spoke to patient and let them know that orthovisc injections have been approved for their right knee. Patient states he's doing fine right now and that quite frankly, they didn't help that much, so he doesn't want to waste his money. I told him that was fine, no problem, and explained they were approved through April 13th 2022 if he were to change his mind to go ahead and give us a call.

## 2022-06-06 ENCOUNTER — TELEPHONE (OUTPATIENT)
Dept: ORTHOPEDIC SURGERY | Age: 72
End: 2022-06-06

## 2022-06-08 ENCOUNTER — OFFICE VISIT (OUTPATIENT)
Dept: ORTHOPEDIC SURGERY | Age: 72
End: 2022-06-08
Payer: MEDICARE

## 2022-06-08 DIAGNOSIS — M21.42 PES PLANUS OF BOTH FEET: ICD-10-CM

## 2022-06-08 DIAGNOSIS — M19.071 LOCALIZED OSTEOARTHRITIS OF RIGHT ANKLE: ICD-10-CM

## 2022-06-08 DIAGNOSIS — M25.571 RIGHT ANKLE PAIN, UNSPECIFIED CHRONICITY: Primary | ICD-10-CM

## 2022-06-08 DIAGNOSIS — M21.41 PES PLANUS OF BOTH FEET: ICD-10-CM

## 2022-06-08 DIAGNOSIS — M76.821 TIBIALIS POSTERIOR TENDONITIS, RIGHT: ICD-10-CM

## 2022-06-08 PROCEDURE — 1123F ACP DISCUSS/DSCN MKR DOCD: CPT | Performed by: FAMILY MEDICINE

## 2022-06-08 PROCEDURE — 4004F PT TOBACCO SCREEN RCVD TLK: CPT | Performed by: FAMILY MEDICINE

## 2022-06-08 PROCEDURE — G8427 DOCREV CUR MEDS BY ELIG CLIN: HCPCS | Performed by: FAMILY MEDICINE

## 2022-06-08 PROCEDURE — G8420 CALC BMI NORM PARAMETERS: HCPCS | Performed by: FAMILY MEDICINE

## 2022-06-08 PROCEDURE — 99214 OFFICE O/P EST MOD 30 MIN: CPT | Performed by: FAMILY MEDICINE

## 2022-06-08 PROCEDURE — 3017F COLORECTAL CA SCREEN DOC REV: CPT | Performed by: FAMILY MEDICINE

## 2022-06-08 RX ORDER — CELECOXIB 200 MG/1
200 CAPSULE ORAL DAILY
Qty: 30 CAPSULE | Refills: 3 | Status: SHIPPED | OUTPATIENT
Start: 2022-06-08

## 2022-06-08 RX ORDER — METHYLPREDNISOLONE 4 MG/1
TABLET ORAL
Qty: 21 KIT | Refills: 0 | Status: SHIPPED | OUTPATIENT
Start: 2022-06-08

## 2022-06-08 NOTE — PROGRESS NOTES
Chief Complaint     Ankle Pain (CK R ANKLE)    FU acute recurrent right medial greater than lateral ankle pain with swelling status post remote inversion with deltoid ligament spraining and medial malleolar avulsion fracture treated fall 2021    History of Present Illness:  Andreas Call is a 67 y.o. 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 Ten Rodriguez who is seen today in follow-up from [facility] for evaluation of an acute injury to the right ankle. Apparently on 9/5/2021 he was going up his stairs at home carrying a laundry basket when the handle broke and he was on the second stair and fell backwards sustaining an inversion injury to his right ankle. He is uncertain as to whether or not there was a pop or crack. He opted to simply go to sleep and the next day woke up with increasing pain and swelling to the medial aspect of his ankle which did prompt an emergency room visit to [facility] where x-rays did reveal an acute appearing avulsion to the medial malleolus consistent with deltoid spraining. Unfortunate they only placed him in a Aircast which is causing more pain than helpfulness and is having pain at 8-9 out of 10 with bearing of weight. At rest it is more of a 3-4 and achy and has had swelling medially. He has had minimal ecchymosis. He has been taking only extra strength Tylenol. He has been seen today for orthopedic and sports consultation review of his imaging. Glenn was initially evaluated for his ankle on 9/8/2021 and is now 17 days out from his injury. Clinically he is doing much better and rates his improvement about 50 to 60%. He did not really tolerate his boot well as it was rubbing his medial ankle. Is trying to ice and elevate and is continued with his style Celebrex which she has tolerated.   He is trying some gentle stretching but still walks with a mildly antalgic gait and does have weakness and stiffness. Denies sensations of loose bodies locking or catching. He has been using an off-the-shelf wrap. Glenn was last seen in the office on 9/22/2021 and continued on treatment for his medial malleolar avulsion fracture with deltoid sprain. Clinically he is doing much better. He has 90+ percent improvement support in his motion exercises. He has been able to return to work in his brace. He is now 4-1/2 weeks out from his injury and is here today for repeat evaluation with x-ray check. I saw Meri Estrella in the office for his ankle on 10/6/2021 following treatment last year for a deltoid ligament sprain with a medial malleolar avulsion. He had done reasonably well with this but states that in early May 2022 he began to notice recurrent pain and swelling to the medial aspect of his ankle. There is no history of definitive injury or trauma and he has been golfing likely the spring. There is no history of trauma and he has had episodic swelling and pain to the medial aspect of his ankle but occasionally will radiate more proximally in the posterior tib tendon region. He admits he has been a little inconsistent with utilizing his inserts and has been also walking and doing some occasional yard work. He no longer cuts the grass. He has been icing and did use heat but has not been taking any medications. Denies sensations of loose bodies locking or catching he does have an achy pain at 2-3 out of 10 but several weeks ago was having pain where he had a difficult time walking with pain up to about a 7-8 out of 10. This has improved about 75% but is having residual symptoms. He is being seen today for repeat evaluation with updated imaging. Medical History    Patient's medications, allergies, past medical, surgical, social and family histories were reviewed and updated as appropriate.     Review of Systems    Pertinent items are noted in HPI  Review of systems reviewed from Patient History Form dated on 9/8/2021 and available in the patient's chart under the Media tab. Vital Signs  There were no vitals filed for this visit. General Exam:     Constitutional: Patient is adequately groomed with no evidence of malnutrition  DTRs: Deep tendon reflexes are intact  Mental Status: The patient is oriented to time, place and person. The patient's mood and affect are appropriate. Lymphatic: The lymphatic examination bilaterally reveals all areas to be without enlargement or induration. Vascular: Examination reveals no swelling or calf tenderness. Peripheral pulses are palpable and 2+. Neurological: The patient has good coordination. There is no weakness or sensory deficit. Ankle Examination  Inspection: He does have mild residual swelling more medially than laterally. No further ecchymosis    Palpation: He does not have substantial clinical tenderness over the medial malleolus and also over the deltoid ligament does have a 2-3 out of 10 pain with palpation of the posterior tibialis tendon. Seem to reproduce the majority of his recent symptoms. Rang of Motion: 25 % reduction in ankle motion. Achilles tightness noted. Strength: Mild weakness with resisted inversion dorsiflexion 4 out of 5. Special Tests: negative anterior drawer testing with negative talar tilt and Irwin's testing. Skin: There are no rashes, ulcerations or lesions. Distal motor sensory and vascular exam is intact. Gait: Improved gait. Mild discomfort with 1 legged toe raise with overpronation noted. Reflex symmetrically preserved. Additional Comments:       Additional Examinations:       Contralateral Exam: Examination of the left ankle reveals intact skin. There is no focal tenderness or swelling. The patient demonstrates full painless range of motion with regards to plantarflexion, dorsiflexion, inversion, eversion.   Strength is 5/5 thorough out all planes. Ligamentous stability is grossly intact. Right Lower Extremity: Examination of the right lower extremity does not show any tenderness, deformity or injury. Range of motion is unremarkable. There is no gross instability. There are no rashes, ulcerations or lesions. Strength and tone are normal.  Left Lower Extremity: Examination of the left lower extremity does not show any tenderness, deformity or injury. Range of motion is unremarkable. There is no gross instability. There are no rashes, ulcerations or lesions. Strength and tone are normal.        Diagnostic Test Findings:     Right ankle AP lateral mortise films were reviewed from the office today and does appear to have a stable appearance to his previous avulsion off the medial malleolus. There is no high-grade syndesmotic or mortise widening. .  I have moderate tibiotalar arthropathy and inferior calcaneal bone spurring. Assessment : #1.  4 weeks status post recurrent right ankle pain with swelling with likely overuse posterior tibialis tendinitis with underlying tibiotalar osteoarthritis status post remote deltoid ligament sprain with medial malleolar avulsion fracture treated fall 2021 with overpronation        Impression:    Encounter Diagnoses   Name Primary?  Right ankle pain, unspecified chronicity Yes    Tibialis posterior tendonitis, right     Localized osteoarthritis of right ankle     Pes planus of both feet        Office Procedures:    Orders Placed This Encounter   Procedures    XR ANKLE RIGHT (MIN 3 VIEWS)     Standing Status:   Future     Number of Occurrences:   1     Standing Expiration Date:   6/8/2023     Order Specific Question:   Reason for exam:     Answer:   pain            Treatment Plan:  Treatment options were discussed with Sylvester Carranza. We did once again review his updated plain films and exam findings.   He was treated last fall 2021 for a deltoid ligament sprain with medial malleolus avulsion fracture. He had been doing reasonably well up until early May 2022 and now appears to be experiencing symptoms with mild pain and swelling over the posterior tibialis tendon. This is likely overuse in nature and he has been inconsistent with use of his inserts. After discussing options, we did place him on a Medrol Dosepak to be followed by a short course of Celebrex 200 mg daily for the next several weeks and we did review his ankle stretching and exercise program.  We discussed formal therapy and wished to try home exercises initially. He may utilize power steps in his lace up brace for at risk activities and we will see him back in a few weeks for follow-up. We may consider ankle imaging if he remains symptomatic to evaluate degree of his ankle arthritic change and rule out a posterior tib split tear if he remains symptomatic. He will contact us in the interim with questions or concerns. This dictation was performed with a verbal recognition program (DRAGON) and it was checked for errors. It is possible that there are still dictated errors within this office note. If so, please bring any errors to my attention for an addendum. All efforts were made to ensure that this office note is accurate.

## 2022-06-08 NOTE — PATIENT INSTRUCTIONS
USE ANKLE BRACE FOR THE NEXT FEW DAYS. TRY TO BE CONSISTENT WITH POWER STEPS IN SHOES. RE-START/CONSISTENCY WITH HOME EXERCISES FOR ANKLE FOR NEXT FEW WEEKS. MEDICATION:  TAKE MEDROL 1ST FOR THE NEXT 6 DAYS. WHEN FINISHED, START CELEBREX 1X/DAY.

## 2023-05-01 NOTE — PROGRESS NOTES
CC:  FU Knee Osteoarthritis with Viscosupplementation       HPI:  Leopold Henry is a 70 y.o. 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 Juana Chavez who is seen today for follow-up on his high-grade medial and anterior compartment osteoarthritis with remnant tearing medial meniscus which is not mechanical in nature with knee synovitis and chronic MCL sprain who is being seen today to continue with his Orthovisc injection to his right knee. Clinically he is feeling better following his first injection has been working his home-based exercise program has been using his brace. He still has some discomfort with repetitive stairclimbing and distance walking but this seems to be improving. Denies left true locking catching or instability symptoms. PE: no substantial change in exam.    Assessment:  Knee Osteoarthritis with viscosupplementation      PROCEDURE NOTE:    PRE-PROCEDURE DIAGNOSIS: DJD knee    POST-PROCEDURE DIAGNOSIS: DJD knee    Injection #2 of Orthovisc. PROCEDURE:  With the patient's permission, his right knee was prepped in standard sterile fashion with Betadine and Alcohol and the prefilled 2cc injection of Orthovisc was injected intra-articularly into the right knee via a superolateral approach without difficulty. The patient tolerated this well without difficulty. A band-aid was applied. POST-PROCEDURE INSTRUCTIONS GIVEN TO PATIENT: The patient was advised to ice the knee for 15-20 minutes to relieve any injection site related pain. FOLLOW-UP: as directed. He will continue with his diclofenac 75 mg 1 pill twice daily as well as use of his brace and his home-based exercise program.  He will be seen back next week to finish up his Orthovisc series. He will contact us in the interim with questions or concerns.
Is This A New Presentation, Or A Follow-Up?: Irritated Skin Lesion
Additional History: Patient picked at itchy skin tag causing it to bleed. He presents today with a scab over the lesion.